# Patient Record
Sex: FEMALE | Race: WHITE | NOT HISPANIC OR LATINO | Employment: OTHER | URBAN - NONMETROPOLITAN AREA
[De-identification: names, ages, dates, MRNs, and addresses within clinical notes are randomized per-mention and may not be internally consistent; named-entity substitution may affect disease eponyms.]

---

## 2017-01-16 ENCOUNTER — OFFICE VISIT (OUTPATIENT)
Dept: FAMILY MEDICINE CLINIC | Facility: CLINIC | Age: 67
End: 2017-01-16

## 2017-01-16 VITALS
TEMPERATURE: 98.1 F | DIASTOLIC BLOOD PRESSURE: 86 MMHG | WEIGHT: 172 LBS | BODY MASS INDEX: 27 KG/M2 | RESPIRATION RATE: 16 BRPM | SYSTOLIC BLOOD PRESSURE: 148 MMHG | HEART RATE: 66 BPM | HEIGHT: 67 IN | OXYGEN SATURATION: 99 %

## 2017-01-16 DIAGNOSIS — I10 ESSENTIAL HYPERTENSION: Primary | ICD-10-CM

## 2017-01-16 PROCEDURE — 99213 OFFICE O/P EST LOW 20 MIN: CPT | Performed by: FAMILY MEDICINE

## 2017-01-16 RX ORDER — DILTIAZEM HYDROCHLORIDE 180 MG/1
180 CAPSULE, EXTENDED RELEASE ORAL DAILY
Qty: 90 CAPSULE | Refills: 2 | Status: CANCELLED | OUTPATIENT
Start: 2017-01-16

## 2017-01-16 RX ORDER — DILTIAZEM HYDROCHLORIDE 240 MG/1
240 CAPSULE, COATED, EXTENDED RELEASE ORAL DAILY
Qty: 30 CAPSULE | Refills: 6 | Status: SHIPPED | OUTPATIENT
Start: 2017-01-16 | End: 2017-04-19 | Stop reason: SDUPTHER

## 2017-01-16 NOTE — MR AVS SNAPSHOT
Kristal Lynn   1/16/2017 1:45 PM   Office Visit    Dept Phone:  [phone]   Encounter #:  [id number]    Provider:  Venkatesh Montoya MD   Department:  [facility]                Your Full Care Plan              Today's Medication Changes          These changes are accurate as of: 1/16/17  2:07 PM.  If you have any questions, ask your nurse or doctor.               Medication(s)that have changed:     diltiaZEM  MG 24 hr capsule   Commonly known as:  CARTIA XT   Take 1 capsule by mouth Daily.   What changed:    - medication strength  - how much to take   Changed by:  Venkatesh Montoya MD            Where to Get Your Medications      These medications were sent to [facility] - [place], [place] - [address] [place] [address] - [phone]  - [phone] [address] [address]     Phone:  [phone]     diltiaZEM  MG 24 hr capsule                  Your Updated Medication List          This list is accurate as of: 1/16/17  2:07 PM.  Always use your most recent med list.                calcium citrate-vitamin d 200-250 MG-UNIT tablet tablet   Commonly known as:  CITRACAL       cetirizine 10 MG tablet   Commonly known as:  zyrTEC       diltiaZEM  MG 24 hr capsule   Commonly known as:  CARTIA XT   Take 1 capsule by mouth Daily.       EPIPEN 2-GLORIA 0.3 MG/0.3ML solution auto-injector injection   Generic drug:  EPINEPHrine   USE AS DIRECTED       hydrochlorothiazide 12.5 MG capsule   Commonly known as:  MICROZIDE   Take 1 capsule by mouth Daily.               You Were Diagnosed With        Codes Comments    Essential hypertension    -  Primary ICD-10-CM: I10  ICD-9-CM: 401.9       Instructions     None    Patient Instructions History      Upcoming Appointments     Visit Type Date Time Department    OFFICE VISIT 1/16/2017  1:45 PM [facility]    OFFICE VISIT 4/18/2017  9:45 AM [facility]    "  MyChart Signup     [facility] allows you to send messages to your doctor, view your test results, renew your prescriptions, schedule appointments, and more. To sign up, go to [employer] and click on the Sign Up Now link in the New User? box. Enter your [employer] Activation Code exactly as it appears below along with the last four digits of your Social Security Number and your Date of Birth () to complete the sign-up process. If you do not sign up before the expiration date, you must request a new code.    [employer] Activation Code: [email or web address]  Expires: 2017  2:05 PM    If you have questions, you can email [email or web address] or call [phone] to talk to our [employer] staff. Remember, [employer] is NOT to be used for urgent needs. For medical emergencies, dial 911.               Other Info from Your Visit           Your Appointments     2017  9:45 AM CDT   Office Visit with Venkatesh Montoya MD   [facility] (--)    [address] [place] [id number] [phone]           Arrive 15 minutes prior to appointment.              Allergies     Aspirin      Diovan [Valsartan]      Latex      Metoprolol      Nsaids      Neomycin-bacitracin Zn-polymyx  Itching, Rash      Reason for Visit     Hypertension 4 week f/u       Vital Signs     Blood Pressure Pulse Temperature Respirations Height Weight    148/86 66 98.1 °F (36.7 °C) (Tympanic) 16 67\" (170.2 cm) 172 lb (78 kg)    Oxygen Saturation Breastfeeding? Body Mass Index Smoking Status          99% No 26.94 kg/m2 Never Smoker        Problems and Diagnoses Noted     High blood pressure        "

## 2017-01-16 NOTE — PROGRESS NOTES
Subjective   Kristal Lynn is a 66 y.o. female.   Chief Complaint   Patient presents with   • Hypertension     4 week f/u        History of Present Illness   Patient with history of hypertension is in today for reevaluation.  Blood pressure still been fluctuating but has been very stressed with the recent death of her mother.  Recent lab work was reviewed and normal.  Echo has not been completed.  Patient has complained of some headache and fatigue and feeling a little anxious  The following portions of the patient's history were reviewed and updated as appropriate: allergies, current medications, past family history, past medical history, past social history, past surgical history and problem list.    Review of Systems   Constitutional: Positive for fatigue.   Eyes: Negative.    Respiratory: Negative.    Cardiovascular: Negative.    Gastrointestinal: Negative.    Endocrine: Negative.    Genitourinary: Negative.    Musculoskeletal: Negative.    Skin: Negative.    Allergic/Immunologic: Negative.    Neurological: Positive for headaches.   Hematological: Negative.    Psychiatric/Behavioral: The patient is nervous/anxious.    All other systems reviewed and are negative.      Objective   Physical Exam   Constitutional: She is oriented to person, place, and time. She appears well-developed and well-nourished.   HENT:   Head: Normocephalic and atraumatic.   Right Ear: External ear normal.   Left Ear: External ear normal.   Nose: Nose normal.   Mouth/Throat: Oropharynx is clear and moist.   Eyes: Conjunctivae and EOM are normal. Pupils are equal, round, and reactive to light.   Neck: Normal range of motion. Neck supple.   Cardiovascular: Normal rate, regular rhythm, normal heart sounds and intact distal pulses.  Exam reveals no gallop and no friction rub.    No murmur heard.  Pulmonary/Chest: Effort normal and breath sounds normal. She has no wheezes. She has no rales.   Abdominal: Soft. Bowel sounds are normal. She  exhibits no mass. There is no tenderness. There is no rebound and no guarding.   Musculoskeletal: Normal range of motion.   Neurological: She is alert and oriented to person, place, and time. She has normal reflexes. No cranial nerve deficit. She exhibits normal muscle tone.   Skin: Skin is warm and dry. No rash noted.   Psychiatric: She has a normal mood and affect. Her behavior is normal. Judgment and thought content normal.   Nursing note and vitals reviewed.      Assessment/Plan   Kristal was seen today for hypertension.    Diagnoses and all orders for this visit:    Essential hypertension    Other orders  -     Cancel: CARTIA  MG 24 hr capsule; Take 1 capsule by mouth Daily.  -     diltiaZEM CD (CARTIA XT) 240 MG 24 hr capsule; Take 1 capsule by mouth Daily.      Echo was rescheduled

## 2017-04-19 ENCOUNTER — OFFICE VISIT (OUTPATIENT)
Dept: FAMILY MEDICINE CLINIC | Facility: CLINIC | Age: 67
End: 2017-04-19

## 2017-04-19 VITALS
TEMPERATURE: 97.3 F | SYSTOLIC BLOOD PRESSURE: 142 MMHG | HEART RATE: 54 BPM | DIASTOLIC BLOOD PRESSURE: 78 MMHG | BODY MASS INDEX: 26.68 KG/M2 | RESPIRATION RATE: 16 BRPM | WEIGHT: 170 LBS | HEIGHT: 67 IN | OXYGEN SATURATION: 96 %

## 2017-04-19 DIAGNOSIS — E78.5 HYPERLIPIDEMIA, UNSPECIFIED HYPERLIPIDEMIA TYPE: ICD-10-CM

## 2017-04-19 DIAGNOSIS — I10 ESSENTIAL HYPERTENSION: Primary | ICD-10-CM

## 2017-04-19 DIAGNOSIS — R73.9 HYPERGLYCEMIA: Primary | ICD-10-CM

## 2017-04-19 DIAGNOSIS — I10 ESSENTIAL HYPERTENSION: ICD-10-CM

## 2017-04-19 DIAGNOSIS — R53.83 FATIGUE, UNSPECIFIED TYPE: ICD-10-CM

## 2017-04-19 PROCEDURE — 99212 OFFICE O/P EST SF 10 MIN: CPT | Performed by: FAMILY MEDICINE

## 2017-04-19 RX ORDER — CLOBETASOL PROPIONATE 0.5 MG/G
OINTMENT TOPICAL 2 TIMES DAILY
COMMUNITY

## 2017-04-19 RX ORDER — HYDROCHLOROTHIAZIDE 12.5 MG/1
12.5 CAPSULE, GELATIN COATED ORAL DAILY
Qty: 90 CAPSULE | Refills: 2 | Status: SHIPPED | OUTPATIENT
Start: 2017-04-19 | End: 2017-12-13 | Stop reason: SDUPTHER

## 2017-04-19 RX ORDER — DILTIAZEM HYDROCHLORIDE 240 MG/1
240 CAPSULE, COATED, EXTENDED RELEASE ORAL DAILY
Qty: 90 CAPSULE | Refills: 2 | Status: SHIPPED | OUTPATIENT
Start: 2017-04-19 | End: 2017-06-13 | Stop reason: DRUGHIGH

## 2017-04-19 RX ORDER — MELATONIN
1000 DAILY
COMMUNITY

## 2017-04-19 NOTE — PROGRESS NOTES
Subjective   Kristal Lynn is a 66 y.o. female.   Chief Complaint   Patient presents with   • Hypertension     3 month f/u        Hypertension   This is a chronic problem. The current episode started more than 1 year ago. The problem has been gradually improving since onset. The problem is controlled. Pertinent negatives include no anxiety, blurred vision, chest pain, headaches, neck pain, orthopnea, palpitations, peripheral edema, PND, shortness of breath or sweats. Agents associated with hypertension include NSAIDs. Past treatments include calcium channel blockers. The current treatment provides significant improvement.        The following portions of the patient's history were reviewed and updated as appropriate: allergies, current medications, past family history, past medical history, past social history, past surgical history and problem list.    Review of Systems   Constitutional: Negative.    HENT: Negative.    Eyes: Negative.  Negative for blurred vision.   Respiratory: Negative.  Negative for shortness of breath.    Cardiovascular: Negative.  Negative for chest pain, palpitations, orthopnea and PND.   Gastrointestinal: Negative.    Endocrine: Negative.    Genitourinary: Negative.    Musculoskeletal: Negative.  Negative for neck pain.   Skin: Negative.    Allergic/Immunologic: Negative.    Neurological: Negative.  Negative for headaches.   Hematological: Negative.    Psychiatric/Behavioral: Negative.    All other systems reviewed and are negative.      Objective   Physical Exam   Constitutional: She is oriented to person, place, and time. She appears well-developed and well-nourished.   HENT:   Head: Normocephalic and atraumatic.   Right Ear: External ear normal.   Left Ear: External ear normal.   Nose: Nose normal.   Mouth/Throat: Oropharynx is clear and moist.   Eyes: Conjunctivae and EOM are normal. Pupils are equal, round, and reactive to light.   Neck: Normal range of motion. Neck supple.    Cardiovascular: Normal rate, regular rhythm, normal heart sounds and intact distal pulses.  Exam reveals no gallop and no friction rub.    No murmur heard.  Pulmonary/Chest: Effort normal and breath sounds normal. She has no wheezes. She has no rales.   Abdominal: Soft. Bowel sounds are normal. She exhibits no mass. There is no tenderness. There is no rebound and no guarding.   Musculoskeletal: Normal range of motion.   Neurological: She is alert and oriented to person, place, and time. She has normal reflexes. No cranial nerve deficit. She exhibits normal muscle tone.   Skin: Skin is warm and dry. No rash noted.   Psychiatric: She has a normal mood and affect. Her behavior is normal. Judgment and thought content normal.   Nursing note and vitals reviewed.      Assessment/Plan   Kristal was seen today for hypertension.    Diagnoses and all orders for this visit:    Essential hypertension      Continue current therapies

## 2017-06-05 LAB
ALBUMIN SERPL-MCNC: 4.4 G/DL (ref 3.2–5.5)
ALBUMIN/GLOB SERPL: 1.6 G/DL (ref 1–3)
ALP SERPL-CCNC: 72 U/L (ref 15–121)
ALT SERPL W P-5'-P-CCNC: 61 U/L (ref 10–60)
ANION GAP SERPL CALCULATED.3IONS-SCNC: 11 MMOL/L (ref 5–15)
AST SERPL-CCNC: 53 U/L (ref 10–60)
BASOPHILS # BLD AUTO: 0.02 10*3/MM3 (ref 0–0.2)
BASOPHILS NFR BLD AUTO: 0.5 % (ref 0–2)
BILIRUB SERPL-MCNC: 0.8 MG/DL (ref 0.2–1)
BUN BLD-MCNC: 10 MG/DL (ref 8–25)
BUN/CREAT SERPL: 11.1 (ref 7–25)
CALCIUM SPEC-SCNC: 9.6 MG/DL (ref 8.4–10.8)
CHLORIDE SERPL-SCNC: 104 MMOL/L (ref 100–112)
CHOLEST SERPL-MCNC: 225 MG/DL (ref 150–200)
CO2 SERPL-SCNC: 28 MMOL/L (ref 20–32)
CREAT BLD-MCNC: 0.9 MG/DL (ref 0.4–1.3)
DEPRECATED RDW RBC AUTO: 42.5 FL (ref 36.4–46.3)
EOSINOPHIL # BLD AUTO: 0.21 10*3/MM3 (ref 0–0.7)
EOSINOPHIL NFR BLD AUTO: 5.3 % (ref 0–7)
ERYTHROCYTE [DISTWIDTH] IN BLOOD BY AUTOMATED COUNT: 13.5 % (ref 11.5–14.5)
GFR SERPL CREATININE-BSD FRML MDRD: 62 ML/MIN/1.73 (ref 45–104)
GLOBULIN UR ELPH-MCNC: 2.7 GM/DL (ref 2.5–4.6)
GLUCOSE BLD-MCNC: 110 MG/DL (ref 70–100)
HCT VFR BLD AUTO: 41.4 % (ref 35–45)
HDLC SERPL-MCNC: 42 MG/DL (ref 35–100)
HGB BLD-MCNC: 14.3 G/DL (ref 12–15.5)
LDLC SERPL CALC-MCNC: 131 MG/DL
LDLC/HDLC SERPL: 3.12 {RATIO}
LYMPHOCYTES # BLD AUTO: 1.51 10*3/MM3 (ref 0.6–4.2)
LYMPHOCYTES NFR BLD AUTO: 38.4 % (ref 10–50)
MCH RBC QN AUTO: 30.6 PG (ref 26.5–34)
MCHC RBC AUTO-ENTMCNC: 34.5 G/DL (ref 31.4–36)
MCV RBC AUTO: 88.5 FL (ref 80–98)
MONOCYTES # BLD AUTO: 0.31 10*3/MM3 (ref 0–0.9)
MONOCYTES NFR BLD AUTO: 7.9 % (ref 0–12)
NEUTROPHILS # BLD AUTO: 1.88 10*3/MM3 (ref 2–8.6)
NEUTROPHILS NFR BLD AUTO: 47.9 % (ref 37–80)
PLATELET # BLD AUTO: 211 10*3/MM3 (ref 150–450)
PMV BLD AUTO: 9.8 FL (ref 8–12)
POTASSIUM BLD-SCNC: 4.1 MMOL/L (ref 3.4–5.4)
PROT SERPL-MCNC: 7.1 G/DL (ref 6.7–8.2)
RBC # BLD AUTO: 4.68 10*6/MM3 (ref 3.77–5.16)
SODIUM BLD-SCNC: 143 MMOL/L (ref 134–146)
TRIGL SERPL-MCNC: 259 MG/DL (ref 35–160)
VLDLC SERPL-MCNC: 51.8 MG/DL
WBC NRBC COR # BLD: 3.93 10*3/MM3 (ref 3.2–9.8)

## 2017-06-05 PROCEDURE — 80061 LIPID PANEL: CPT | Performed by: FAMILY MEDICINE

## 2017-06-05 PROCEDURE — 82607 VITAMIN B-12: CPT | Performed by: FAMILY MEDICINE

## 2017-06-05 PROCEDURE — 83036 HEMOGLOBIN GLYCOSYLATED A1C: CPT | Performed by: FAMILY MEDICINE

## 2017-06-05 PROCEDURE — 80053 COMPREHEN METABOLIC PANEL: CPT | Performed by: FAMILY MEDICINE

## 2017-06-05 PROCEDURE — 85025 COMPLETE CBC W/AUTO DIFF WBC: CPT | Performed by: FAMILY MEDICINE

## 2017-06-05 PROCEDURE — 36415 COLL VENOUS BLD VENIPUNCTURE: CPT | Performed by: FAMILY MEDICINE

## 2017-06-05 PROCEDURE — 84443 ASSAY THYROID STIM HORMONE: CPT | Performed by: FAMILY MEDICINE

## 2017-06-06 LAB
HBA1C MFR BLD: 5.74 % (ref 4–5.6)
TSH SERPL DL<=0.05 MIU/L-ACNC: 3.91 MIU/ML (ref 0.46–4.68)
VIT B12 BLD-MCNC: 764 PG/ML (ref 239–931)

## 2017-06-13 ENCOUNTER — OFFICE VISIT (OUTPATIENT)
Dept: FAMILY MEDICINE CLINIC | Facility: CLINIC | Age: 67
End: 2017-06-13

## 2017-06-13 VITALS
HEART RATE: 63 BPM | DIASTOLIC BLOOD PRESSURE: 76 MMHG | SYSTOLIC BLOOD PRESSURE: 132 MMHG | OXYGEN SATURATION: 97 % | TEMPERATURE: 97.1 F | HEIGHT: 67 IN | BODY MASS INDEX: 27 KG/M2 | WEIGHT: 172 LBS

## 2017-06-13 DIAGNOSIS — E78.01 FAMILIAL HYPERCHOLESTEROLEMIA: ICD-10-CM

## 2017-06-13 DIAGNOSIS — R73.9 HYPERGLYCEMIA: ICD-10-CM

## 2017-06-13 DIAGNOSIS — I10 ESSENTIAL HYPERTENSION: Primary | ICD-10-CM

## 2017-06-13 PROCEDURE — 99214 OFFICE O/P EST MOD 30 MIN: CPT | Performed by: FAMILY MEDICINE

## 2017-06-13 RX ORDER — DILTIAZEM HYDROCHLORIDE 180 MG/1
180 CAPSULE, COATED, EXTENDED RELEASE ORAL DAILY
Qty: 30 CAPSULE | Refills: 6 | Status: SHIPPED | OUTPATIENT
Start: 2017-06-13 | End: 2017-12-13 | Stop reason: SDUPTHER

## 2017-06-13 NOTE — PATIENT INSTRUCTIONS

## 2017-06-13 NOTE — PROGRESS NOTES
Subjective   Kristal Lynn is a 66 y.o. female who presents to the office for follow-up and review of labs.     History of Present Illness   Patient with history of hypertension, hyperlipidemia, and hyperglycemia is in today for reevaluation and to review labs.  It has been complaining of fatigue and low heart rate.  Also had a few episodes of low blood pressure.  Denies chest pain PND orthopnea.  No neurological symptoms.    The following portions of the patient's history were reviewed and updated as appropriate: allergies, current medications, past family history, past medical history, past social history, past surgical history and problem list.    Review of Systems   Constitutional: Positive for fatigue.   HENT: Negative.    Eyes: Negative.    Respiratory: Negative.    Cardiovascular: Negative.    Gastrointestinal: Negative.    Endocrine: Negative.    Genitourinary: Negative.    Musculoskeletal: Negative.    Skin: Negative.    Allergic/Immunologic: Negative.    Neurological: Positive for weakness.   Hematological: Negative.    Psychiatric/Behavioral: Negative.    All other systems reviewed and are negative.      Objective   Physical Exam   Constitutional: She is oriented to person, place, and time. She appears well-developed and well-nourished.   HENT:   Head: Normocephalic and atraumatic.   Right Ear: External ear normal.   Left Ear: External ear normal.   Nose: Nose normal.   Mouth/Throat: Oropharynx is clear and moist.   Eyes: Conjunctivae and EOM are normal. Pupils are equal, round, and reactive to light.   Neck: Normal range of motion. Neck supple.   Cardiovascular: Normal rate, regular rhythm, normal heart sounds and intact distal pulses.  Exam reveals no gallop and no friction rub.    No murmur heard.  Pulmonary/Chest: Effort normal and breath sounds normal. She has no wheezes. She has no rales.   Abdominal: Soft. Bowel sounds are normal. She exhibits no mass. There is no tenderness. There is no rebound  and no guarding.   Musculoskeletal: Normal range of motion.   Neurological: She is alert and oriented to person, place, and time. She has normal reflexes. No cranial nerve deficit. She exhibits normal muscle tone.   Skin: Skin is warm and dry. No rash noted.   Psychiatric: She has a normal mood and affect. Her behavior is normal. Judgment and thought content normal.   Nursing note and vitals reviewed.      Assessment/Plan   Kristal was seen today for hypertension, hyperlipidemia and results.    Diagnoses and all orders for this visit:    Essential hypertension    Familial hypercholesterolemia  -     LDL Cholesterol, Direct; Future    Hyperglycemia  -     Comprehensive Metabolic Panel; Future  -     Hemoglobin A1c; Future  -     LDL Cholesterol, Direct; Future    Other orders  -     diltiaZEM CD (CARDIZEM CD) 180 MG 24 hr capsule; Take 1 capsule by mouth Daily.         Labs are reviewed with patient.    PHQ-9 Depression Screening 6/13/2017   Little interest or pleasure in doing things 0   Feeling down, depressed, or hopeless 0   Trouble falling or staying asleep, or sleeping too much 0   Feeling tired or having little energy 1   Poor appetite or overeating 0   Feeling bad about yourself - or that you are a failure or have let yourself or your family down 0   Trouble concentrating on things, such as reading the newspaper or watching television 0   Moving or speaking so slowly that other people could have noticed. Or the opposite - being so fidgety or restless that you have been moving around a lot more than usual 0   Thoughts that you would be better off dead, or of hurting yourself in some way 0   PHQ-9 Total Score 1   If you checked off any problems, how difficult have these problems made it for you to do your work, take care of things at home, or get along with other people? Somewhat difficult         Orders Only on 04/19/2017   Component Date Value Ref Range Status   • Glucose 06/05/2017 110* 70 - 100 mg/dL Final    • BUN 06/05/2017 10  8 - 25 mg/dL Final   • Creatinine 06/05/2017 0.90  0.40 - 1.30 mg/dL Final   • Sodium 06/05/2017 143  134 - 146 mmol/L Final   • Potassium 06/05/2017 4.1  3.4 - 5.4 mmol/L Final   • Chloride 06/05/2017 104  100 - 112 mmol/L Final   • CO2 06/05/2017 28.0  20.0 - 32.0 mmol/L Final   • Calcium 06/05/2017 9.6  8.4 - 10.8 mg/dL Final   • Total Protein 06/05/2017 7.1  6.7 - 8.2 g/dL Final   • Albumin 06/05/2017 4.40  3.20 - 5.50 g/dL Final   • ALT (SGPT) 06/05/2017 61* 10 - 60 U/L Final   • AST (SGOT) 06/05/2017 53  10 - 60 U/L Final   • Alkaline Phosphatase 06/05/2017 72  15 - 121 U/L Final   • Total Bilirubin 06/05/2017 0.8  0.2 - 1.0 mg/dL Final   • eGFR Non African Amer 06/05/2017 62  45 - 104 mL/min/1.73 Final   • Globulin 06/05/2017 2.7  2.5 - 4.6 gm/dL Final   • A/G Ratio 06/05/2017 1.6  1.0 - 3.0 g/dL Final   • BUN/Creatinine Ratio 06/05/2017 11.1  7.0 - 25.0 Final   • Anion Gap 06/05/2017 11.0  5.0 - 15.0 mmol/L Final   • Total Cholesterol 06/05/2017 225* 150 - 200 mg/dL Final   • Triglycerides 06/05/2017 259* 35 - 160 mg/dL Final   • HDL Cholesterol 06/05/2017 42  35 - 100 mg/dL Final   • LDL Cholesterol  06/05/2017 131  mg/dL Final   • VLDL Cholesterol 06/05/2017 51.8  mg/dL Final   • LDL/HDL Ratio 06/05/2017 3.12   Final   • TSH 06/05/2017 3.910  0.460 - 4.680 mIU/mL Final   • Hemoglobin A1C 06/05/2017 5.74* 4 - 5.6 % Final   • Vitamin B-12 06/05/2017 764  239 - 931 pg/mL Final   • WBC 06/05/2017 3.93  3.20 - 9.80 10*3/mm3 Final   • RBC 06/05/2017 4.68  3.77 - 5.16 10*6/mm3 Final   • Hemoglobin 06/05/2017 14.3  12.0 - 15.5 g/dL Final   • Hematocrit 06/05/2017 41.4  35.0 - 45.0 % Final   • MCV 06/05/2017 88.5  80.0 - 98.0 fL Final   • MCH 06/05/2017 30.6  26.5 - 34.0 pg Final   • MCHC 06/05/2017 34.5  31.4 - 36.0 g/dL Final   • RDW 06/05/2017 13.5  11.5 - 14.5 % Final   • RDW-SD 06/05/2017 42.5  36.4 - 46.3 fl Final   • MPV 06/05/2017 9.8  8.0 - 12.0 fL Final   • Platelets 06/05/2017 211   150 - 450 10*3/mm3 Final   • Neutrophil % 06/05/2017 47.9  37.0 - 80.0 % Final   • Lymphocyte % 06/05/2017 38.4  10.0 - 50.0 % Final   • Monocyte % 06/05/2017 7.9  0.0 - 12.0 % Final   • Eosinophil % 06/05/2017 5.3  0.0 - 7.0 % Final   • Basophil % 06/05/2017 0.5  0.0 - 2.0 % Final   • Neutrophils, Absolute 06/05/2017 1.88* 2.00 - 8.60 10*3/mm3 Final   • Lymphocytes, Absolute 06/05/2017 1.51  0.60 - 4.20 10*3/mm3 Final   • Monocytes, Absolute 06/05/2017 0.31  0.00 - 0.90 10*3/mm3 Final   • Eosinophils, Absolute 06/05/2017 0.21  0.00 - 0.70 10*3/mm3 Final   • Basophils, Absolute 06/05/2017 0.02  0.00 - 0.20 10*3/mm3 Final   Hospital Outpatient Visit on 12/19/2016   Component Date Value Ref Range Status   • TSH 12/19/2016 2.68  0.46 - 4.68 uIU/ml Final   • Vitamin B-12 12/19/2016 383  239 - 931 pg/ml Final   ]

## 2017-12-06 ENCOUNTER — LAB (OUTPATIENT)
Dept: LAB | Facility: OTHER | Age: 67
End: 2017-12-06

## 2017-12-06 DIAGNOSIS — R73.9 HYPERGLYCEMIA: ICD-10-CM

## 2017-12-06 DIAGNOSIS — E78.01 FAMILIAL HYPERCHOLESTEROLEMIA: ICD-10-CM

## 2017-12-06 LAB
ALBUMIN SERPL-MCNC: 4.2 G/DL (ref 3.2–5.5)
ALBUMIN/GLOB SERPL: 1.6 G/DL (ref 1–3)
ALP SERPL-CCNC: 81 U/L (ref 15–121)
ALT SERPL W P-5'-P-CCNC: 42 U/L (ref 10–60)
ANION GAP SERPL CALCULATED.3IONS-SCNC: 10 MMOL/L (ref 5–15)
ARTICHOKE IGE QN: 130 MG/DL (ref 0–129)
AST SERPL-CCNC: 35 U/L (ref 10–60)
BILIRUB SERPL-MCNC: 0.7 MG/DL (ref 0.2–1)
BUN BLD-MCNC: 15 MG/DL (ref 8–25)
BUN/CREAT SERPL: 18.8 (ref 7–25)
CALCIUM SPEC-SCNC: 9.3 MG/DL (ref 8.4–10.8)
CHLORIDE SERPL-SCNC: 105 MMOL/L (ref 100–112)
CO2 SERPL-SCNC: 29 MMOL/L (ref 20–32)
CREAT BLD-MCNC: 0.8 MG/DL (ref 0.4–1.3)
GFR SERPL CREATININE-BSD FRML MDRD: 71 ML/MIN/1.73 (ref 45–104)
GLOBULIN UR ELPH-MCNC: 2.6 GM/DL (ref 2.5–4.6)
GLUCOSE BLD-MCNC: 113 MG/DL (ref 70–100)
HBA1C MFR BLD: 5.6 % (ref 4–5.6)
POTASSIUM BLD-SCNC: 3.3 MMOL/L (ref 3.4–5.4)
PROT SERPL-MCNC: 6.8 G/DL (ref 6.7–8.2)
SODIUM BLD-SCNC: 144 MMOL/L (ref 134–146)

## 2017-12-06 PROCEDURE — 83721 ASSAY OF BLOOD LIPOPROTEIN: CPT | Performed by: FAMILY MEDICINE

## 2017-12-06 PROCEDURE — 80053 COMPREHEN METABOLIC PANEL: CPT | Performed by: FAMILY MEDICINE

## 2017-12-06 PROCEDURE — 36415 COLL VENOUS BLD VENIPUNCTURE: CPT | Performed by: FAMILY MEDICINE

## 2017-12-06 PROCEDURE — 83036 HEMOGLOBIN GLYCOSYLATED A1C: CPT | Performed by: FAMILY MEDICINE

## 2017-12-13 ENCOUNTER — OFFICE VISIT (OUTPATIENT)
Dept: FAMILY MEDICINE CLINIC | Facility: CLINIC | Age: 67
End: 2017-12-13

## 2017-12-13 VITALS
DIASTOLIC BLOOD PRESSURE: 80 MMHG | SYSTOLIC BLOOD PRESSURE: 118 MMHG | RESPIRATION RATE: 16 BRPM | TEMPERATURE: 97.1 F | HEART RATE: 61 BPM | HEIGHT: 67 IN | WEIGHT: 174.6 LBS | OXYGEN SATURATION: 97 % | BODY MASS INDEX: 27.4 KG/M2

## 2017-12-13 DIAGNOSIS — E78.01 FAMILIAL HYPERCHOLESTEROLEMIA: ICD-10-CM

## 2017-12-13 DIAGNOSIS — E87.6 HYPOKALEMIA: ICD-10-CM

## 2017-12-13 DIAGNOSIS — R73.9 HYPERGLYCEMIA: ICD-10-CM

## 2017-12-13 DIAGNOSIS — I10 ESSENTIAL HYPERTENSION: Primary | ICD-10-CM

## 2017-12-13 PROCEDURE — 99213 OFFICE O/P EST LOW 20 MIN: CPT | Performed by: FAMILY MEDICINE

## 2017-12-13 RX ORDER — HYDROCHLOROTHIAZIDE 12.5 MG/1
12.5 CAPSULE, GELATIN COATED ORAL DAILY
Qty: 90 CAPSULE | Refills: 2 | Status: SHIPPED | OUTPATIENT
Start: 2017-12-13 | End: 2018-06-13 | Stop reason: SDUPTHER

## 2017-12-13 RX ORDER — DILTIAZEM HYDROCHLORIDE 180 MG/1
180 CAPSULE, COATED, EXTENDED RELEASE ORAL DAILY
Qty: 90 CAPSULE | Refills: 3 | Status: SHIPPED | OUTPATIENT
Start: 2017-12-13 | End: 2018-06-13 | Stop reason: SDUPTHER

## 2017-12-13 NOTE — PROGRESS NOTES
Subjective   Kristal Lynn is a 67 y.o. female.   Chief Complaint   Patient presents with   • Hypertension   • Hyperlipidemia       Hypertension   This is a chronic problem. The current episode started more than 1 year ago. The problem is unchanged. The problem is controlled. Pertinent negatives include no blurred vision, chest pain, malaise/fatigue, palpitations, PND or shortness of breath. There are no associated agents to hypertension. Past treatments include calcium channel blockers and diuretics. The current treatment provides significant improvement. There are no compliance problems.    Hyperlipidemia   Pertinent negatives include no chest pain or shortness of breath.        The following portions of the patient's history were reviewed and updated as appropriate: allergies, current medications, past family history, past medical history, past social history, past surgical history and problem list.    Review of Systems   Constitutional: Positive for fatigue. Negative for malaise/fatigue.   HENT: Negative.    Eyes: Negative.  Negative for blurred vision.   Respiratory: Negative.  Negative for shortness of breath.    Cardiovascular: Negative.  Negative for chest pain, palpitations and PND.   Gastrointestinal: Negative.    Endocrine: Negative.    Genitourinary: Negative.    Musculoskeletal: Negative.    Skin: Negative.    Allergic/Immunologic: Negative.    Neurological: Positive for weakness.   Hematological: Negative.    Psychiatric/Behavioral: Negative.    All other systems reviewed and are negative.      Objective   Physical Exam   Constitutional: She is oriented to person, place, and time. She appears well-developed and well-nourished.   HENT:   Head: Normocephalic and atraumatic.   Right Ear: External ear normal.   Left Ear: External ear normal.   Nose: Nose normal.   Mouth/Throat: Oropharynx is clear and moist.   Eyes: Conjunctivae and EOM are normal. Pupils are equal, round, and reactive to light.   Neck:  Normal range of motion. Neck supple.   Cardiovascular: Normal rate, regular rhythm, normal heart sounds and intact distal pulses.  Exam reveals no gallop and no friction rub.    No murmur heard.  Pulmonary/Chest: Effort normal and breath sounds normal. She has no wheezes. She has no rales.   Abdominal: Soft. Bowel sounds are normal. She exhibits no mass. There is no tenderness. There is no rebound and no guarding.   Musculoskeletal: Normal range of motion.   Neurological: She is alert and oriented to person, place, and time. She has normal reflexes. No cranial nerve deficit. She exhibits normal muscle tone.   Skin: Skin is warm and dry. No rash noted.   Psychiatric: She has a normal mood and affect. Her behavior is normal. Judgment and thought content normal.   Nursing note and vitals reviewed.  Labs reviewed.    Assessment/Plan   Kristal was seen today for hypertension and hyperlipidemia.    Diagnoses and all orders for this visit:    Essential hypertension  -     CBC & Differential; Future  -     Comprehensive Metabolic Panel; Future  -     Lipid Panel; Future  -     TSH; Future    Familial hypercholesterolemia    Hyperglycemia  -     Hemoglobin A1c; Future    Hypokalemia    Other orders  -     hydrochlorothiazide (MICROZIDE) 12.5 MG capsule; Take 1 capsule by mouth Daily.        Recommended OTC potassium supplementation as well.

## 2017-12-20 ENCOUNTER — OFFICE VISIT (OUTPATIENT)
Dept: FAMILY MEDICINE CLINIC | Facility: CLINIC | Age: 67
End: 2017-12-20

## 2017-12-20 VITALS
HEART RATE: 77 BPM | BODY MASS INDEX: 26.84 KG/M2 | TEMPERATURE: 101.1 F | OXYGEN SATURATION: 93 % | DIASTOLIC BLOOD PRESSURE: 86 MMHG | HEIGHT: 67 IN | SYSTOLIC BLOOD PRESSURE: 142 MMHG | WEIGHT: 171 LBS

## 2017-12-20 DIAGNOSIS — R68.89 FLU-LIKE SYMPTOMS: ICD-10-CM

## 2017-12-20 DIAGNOSIS — I10 ESSENTIAL HYPERTENSION: ICD-10-CM

## 2017-12-20 DIAGNOSIS — R50.9 FEVER, UNSPECIFIED FEVER CAUSE: ICD-10-CM

## 2017-12-20 DIAGNOSIS — J06.9 VIRAL UPPER RESPIRATORY TRACT INFECTION: Primary | ICD-10-CM

## 2017-12-20 LAB
EXPIRATION DATE: NORMAL
FLUAV AG NPH QL: NEGATIVE
FLUBV AG NPH QL: NEGATIVE
INTERNAL CONTROL: NORMAL
Lab: NORMAL

## 2017-12-20 PROCEDURE — 99213 OFFICE O/P EST LOW 20 MIN: CPT | Performed by: NURSE PRACTITIONER

## 2017-12-20 PROCEDURE — 87804 INFLUENZA ASSAY W/OPTIC: CPT | Performed by: NURSE PRACTITIONER

## 2017-12-20 RX ORDER — ESTRADIOL 0.1 MG/G
1 CREAM VAGINAL DAILY
COMMUNITY
Start: 2017-12-14 | End: 2018-03-20

## 2017-12-20 NOTE — PROGRESS NOTES
Chief Complaint   Patient presents with   • Generalized Body Aches     x 2 days    • Nasal Congestion       Subjective   Kristal Lynn is a 67 y.o. female presents to the office for evaluation of flu-like symptoms x 2 days.    PMH  HTN- well controlled with HCTz and dilt    HPI   Patient states she became ill yesterday with nasal congestion. Overnight, she developed a cough, chills, fever and body aches. She has taken tylenol X 1 with moderate relief of symptoms. She denies known contact with flu.       Fever    This is a new problem. The current episode started yesterday. The problem occurs intermittently. The problem has been unchanged. The maximum temperature noted was 101 to 101.9 F. Associated symptoms include congestion, coughing, headaches and muscle aches. Pertinent negatives include no abdominal pain, chest pain, diarrhea, ear pain, nausea, rash, sore throat, urinary pain or wheezing. She has tried acetaminophen for the symptoms. The treatment provided moderate relief.   Risk factors: no recent sickness      Family History   Problem Relation Age of Onset   • Cancer Other    • Heart disease Other      Social History     Social History   • Marital status:      Spouse name: Venkatesh LAZAR   • Number of children: 2   • Years of education: N/A     Occupational History   • Not on file.     Social History Main Topics   • Smoking status: Never Smoker   • Smokeless tobacco: Never Used   • Alcohol use No   • Drug use: No   • Sexual activity: Yes     Partners: Male     Other Topics Concern   • Not on file     Social History Narrative       The following portions of the patient's history were reviewed and updated as appropriate: allergies, current medications, past family history, past medical history, past social history, past surgical history and problem list.    Review of Systems   Constitutional: Positive for chills, fatigue and fever. Negative for activity change and unexpected weight change.   HENT: Positive  "for congestion, postnasal drip and rhinorrhea. Negative for ear pain, sinus pressure and sore throat.    Eyes: Negative.  Negative for pain and redness.   Respiratory: Positive for cough. Negative for choking, chest tightness, shortness of breath and wheezing.    Cardiovascular: Negative.  Negative for chest pain, palpitations and leg swelling.   Gastrointestinal: Negative.  Negative for abdominal distention, abdominal pain, constipation, diarrhea, nausea and rectal pain.   Endocrine: Negative.    Genitourinary: Negative.  Negative for decreased urine volume, difficulty urinating, dysuria, flank pain, hematuria and urgency.   Musculoskeletal: Negative.  Negative for gait problem and neck pain.   Skin: Negative.  Negative for rash and wound.   Allergic/Immunologic: Negative.    Neurological: Positive for headaches. Negative for dizziness, syncope, weakness, light-headedness and numbness.   Hematological: Negative.    Psychiatric/Behavioral: Negative.  Negative for agitation, behavioral problems, confusion, self-injury, sleep disturbance and suicidal ideas. The patient is not nervous/anxious.      14 Point ROS completed with pertinent positives discussed. All other systems reviewed and are negative.       Objective   Encounter Vitals  /86  Pulse 77  Temp (!) 101.1 °F (38.4 °C) (Tympanic)   Ht 170.2 cm (67\")  Wt 77.6 kg (171 lb)  SpO2 93%  Breastfeeding? No  BMI 26.78 kg/m2    Physical Exam   Constitutional: She is oriented to person, place, and time. Vital signs are normal. She appears well-developed and well-nourished.   HENT:   Head: Normocephalic and atraumatic.   Right Ear: Tympanic membrane, external ear and ear canal normal.   Left Ear: Tympanic membrane, external ear and ear canal normal.   Nose: Nose normal. Right sinus exhibits no maxillary sinus tenderness and no frontal sinus tenderness. Left sinus exhibits no maxillary sinus tenderness and no frontal sinus tenderness.   Mouth/Throat: Uvula is " midline, oropharynx is clear and moist and mucous membranes are normal. No posterior oropharyngeal edema or posterior oropharyngeal erythema. No tonsillar exudate.   Copious clear post nasal drip   Eyes: Lids are normal. Pupils are equal, round, and reactive to light.   Neck: Trachea normal and normal range of motion. Neck supple. No thyroid mass present.   Cardiovascular: Normal rate, regular rhythm, S1 normal, S2 normal, normal heart sounds and intact distal pulses.  Exam reveals no gallop and no friction rub.    No murmur heard.  Pulmonary/Chest: Effort normal and breath sounds normal. No respiratory distress. She has no wheezes. She has no rales.   Abdominal: Soft. Normal appearance and bowel sounds are normal. She exhibits no mass. There is no rebound and no guarding.   Musculoskeletal: Normal range of motion.   Lymphadenopathy:     She has no cervical adenopathy.   Neurological: She is alert and oriented to person, place, and time. She has normal strength. No cranial nerve deficit or sensory deficit. Gait normal.   Skin: Skin is warm and dry. No rash noted.   Psychiatric: She has a normal mood and affect. Her speech is normal and behavior is normal. Judgment and thought content normal. Cognition and memory are normal.   Nursing note and vitals reviewed.      Pertinent Labs  Office Visit on 12/20/2017   Component Date Value Ref Range Status   • Rapid Influenza A Ag 12/20/2017 negative   Final   • Rapid Influenza B Ag 12/20/2017 negative   Final   • Internal Control 12/20/2017 Passed  Passed Final   • Lot Number 12/20/2017 [id number]   Final   • Expiration Date 12/20/2017 9/2019   Final   Lab on 12/06/2017   Component Date Value Ref Range Status   • Glucose 12/06/2017 113* 70 - 100 mg/dL Final   • BUN 12/06/2017 15  8 - 25 mg/dL Final   • Creatinine 12/06/2017 0.80  0.40 - 1.30 mg/dL Final   • Sodium 12/06/2017 144  134 - 146 mmol/L Final   • Potassium 12/06/2017 3.3* 3.4 - 5.4 mmol/L Final   • Chloride 12/06/2017  105  100 - 112 mmol/L Final   • CO2 12/06/2017 29.0  20.0 - 32.0 mmol/L Final   • Calcium 12/06/2017 9.3  8.4 - 10.8 mg/dL Final   • Total Protein 12/06/2017 6.8  6.7 - 8.2 g/dL Final   • Albumin 12/06/2017 4.20  3.20 - 5.50 g/dL Final   • ALT (SGPT) 12/06/2017 42  10 - 60 U/L Final   • AST (SGOT) 12/06/2017 35  10 - 60 U/L Final   • Alkaline Phosphatase 12/06/2017 81  15 - 121 U/L Final   • Total Bilirubin 12/06/2017 0.7  0.2 - 1.0 mg/dL Final   • eGFR Non African Amer 12/06/2017 71  45 - 104 mL/min/1.73 Final   • Globulin 12/06/2017 2.6  2.5 - 4.6 gm/dL Final   • A/G Ratio 12/06/2017 1.6  1.0 - 3.0 g/dL Final   • BUN/Creatinine Ratio 12/06/2017 18.8  7.0 - 25.0 Final   • Anion Gap 12/06/2017 10.0  5.0 - 15.0 mmol/L Final   • Hemoglobin A1C 12/06/2017 5.6  4 - 5.6 % Final   • LDL Cholesterol  12/06/2017 130* 0 - 129 mg/dL Final     Labs have been independently reviewed    Key Imaging/Tracings/POC Testing    Assessment and Medications  Problems Addressed this Visit        Cardiovascular and Mediastinum    Essential hypertension      Other Visit Diagnoses     Viral upper respiratory tract infection    -  Primary    Fever, unspecified fever cause        Flu-like symptoms        Relevant Orders    POCT Influenza A/B (Completed)        Side effects of ordered medications discussed with patient.     Plan/Additional Notes/Instructions  Plan   1. Viral URI  2. Treat symptoms with otc medication  3. If no better by Friday, call and report. Ok with ordering PO abx- zpack  4. RTC if symptoms worsen    Follow-Up  Return if symptoms worsen or fail to improve.    Patient/caregiver verbalizes understanding of all orders and instructions in this plan of care.       This document has been electronically signed by JERAD Abbott on December 20, 2017 3:13 PM

## 2018-01-04 ENCOUNTER — OFFICE VISIT (OUTPATIENT)
Dept: FAMILY MEDICINE CLINIC | Facility: CLINIC | Age: 68
End: 2018-01-04

## 2018-01-04 VITALS
OXYGEN SATURATION: 97 % | HEART RATE: 62 BPM | WEIGHT: 173.2 LBS | HEIGHT: 67 IN | DIASTOLIC BLOOD PRESSURE: 82 MMHG | BODY MASS INDEX: 27.18 KG/M2 | TEMPERATURE: 99.1 F | SYSTOLIC BLOOD PRESSURE: 110 MMHG

## 2018-01-04 DIAGNOSIS — R59.9 SWELLING OF LYMPH NODE: ICD-10-CM

## 2018-01-04 DIAGNOSIS — J02.9 ACUTE PHARYNGITIS, UNSPECIFIED ETIOLOGY: Primary | ICD-10-CM

## 2018-01-04 PROCEDURE — 99214 OFFICE O/P EST MOD 30 MIN: CPT | Performed by: NURSE PRACTITIONER

## 2018-01-04 RX ORDER — AMOXICILLIN AND CLAVULANATE POTASSIUM 875; 125 MG/1; MG/1
1 TABLET, FILM COATED ORAL 2 TIMES DAILY
Qty: 20 TABLET | Refills: 0 | Status: SHIPPED | OUTPATIENT
Start: 2018-01-04 | End: 2018-03-20

## 2018-01-04 RX ORDER — FLUCONAZOLE 150 MG/1
150 TABLET ORAL ONCE
Qty: 2 TABLET | Refills: 0 | Status: SHIPPED | OUTPATIENT
Start: 2018-01-04 | End: 2018-01-04

## 2018-01-04 RX ORDER — CEFTRIAXONE 1 G/1
1 INJECTION, POWDER, FOR SOLUTION INTRAMUSCULAR; INTRAVENOUS ONCE
Status: DISCONTINUED | OUTPATIENT
Start: 2018-01-04 | End: 2018-03-20

## 2018-01-04 NOTE — PROGRESS NOTES
Chief Complaint   Patient presents with   • Neck Pain     hurts when eating, moving, talking and at different times. one side feels diff than other but not sore throat       Subjective   Kristal Lynn is a 67 y.o. female presents to the office for evaluation of left throat pain X 3 days.    PMH  HTN- well controlled with HCTz and dilt    HPI   Patient presents with acute c/o left throat/gland pain that is exacerbated by eating, moving mouth, and turning her head to the left. Pain began on Tues. She took a tylenol last night with mild relief of pain. Associated symptoms include nasal congestion and low grade fever. No tooth pain, throat pain, muscle aches, or colored drainage.     Last TSH 3.910    HPI  Family History   Problem Relation Age of Onset   • Cancer Other    • Heart disease Other      Social History     Social History   • Marital status:      Spouse name: Venkatesh LAZAR   • Number of children: 2   • Years of education: N/A     Occupational History   • Not on file.     Social History Main Topics   • Smoking status: Never Smoker   • Smokeless tobacco: Never Used   • Alcohol use No   • Drug use: No   • Sexual activity: Yes     Partners: Male     Other Topics Concern   • Not on file     Social History Narrative       The following portions of the patient's history were reviewed and updated as appropriate: allergies, current medications, past family history, past medical history, past social history, past surgical history and problem list.    Review of Systems   Constitutional: Negative.  Negative for activity change, chills, fatigue, fever and unexpected weight change.   HENT: Positive for congestion. Negative for ear pain, postnasal drip, rhinorrhea, sinus pressure, sore throat, trouble swallowing and voice change.    Eyes: Negative.  Negative for pain and redness.   Respiratory: Negative.  Negative for cough, choking, chest tightness, shortness of breath and wheezing.    Cardiovascular: Negative.   "Negative for chest pain, palpitations and leg swelling.   Gastrointestinal: Negative.  Negative for abdominal distention, abdominal pain, constipation, diarrhea, nausea and rectal pain.   Endocrine: Negative.    Genitourinary: Negative.  Negative for decreased urine volume, difficulty urinating, dysuria, flank pain, hematuria and urgency.   Musculoskeletal: Negative.  Negative for gait problem and neck pain.   Skin: Negative.  Negative for rash and wound.   Allergic/Immunologic: Negative.    Neurological: Negative.  Negative for dizziness, syncope, weakness, light-headedness, numbness and headaches.   Hematological: Negative.    Psychiatric/Behavioral: Negative.  Negative for agitation, behavioral problems, confusion, self-injury, sleep disturbance and suicidal ideas. The patient is not nervous/anxious.      14 Point ROS completed with pertinent positives discussed. All other systems reviewed and are negative.       Objective   Encounter Vitals  /82  Pulse 62  Temp 99.1 °F (37.3 °C)  Ht 170.2 cm (67\")  Wt 78.6 kg (173 lb 3.2 oz)  SpO2 97%  BMI 27.13 kg/m2    Physical Exam   Constitutional: She is oriented to person, place, and time. Vital signs are normal. She appears well-developed and well-nourished.  Non-toxic appearance.   Low grade temp   HENT:   Head: Normocephalic and atraumatic.   Right Ear: Tympanic membrane, external ear and ear canal normal.   Left Ear: Tympanic membrane and external ear normal.   Nose: Nose normal. Right sinus exhibits no maxillary sinus tenderness and no frontal sinus tenderness. Left sinus exhibits no maxillary sinus tenderness and no frontal sinus tenderness.   Mouth/Throat: Uvula is midline and mucous membranes are normal. Posterior oropharyngeal erythema present. No posterior oropharyngeal edema.   Clear post nasal drip  Cerumen to left ext canal   Eyes: Lids are normal. Pupils are equal, round, and reactive to light.   Neck: Trachea normal and normal range of motion. " Neck supple. No thyroid mass and no thyromegaly present.       Cardiovascular: Normal rate, regular rhythm, S1 normal, S2 normal, normal heart sounds and intact distal pulses.  Exam reveals no gallop and no friction rub.    No murmur heard.  Pulmonary/Chest: Effort normal and breath sounds normal. No respiratory distress. She has no wheezes. She has no rales.   Abdominal: Soft. Normal appearance and bowel sounds are normal. She exhibits no mass. There is no rebound and no guarding.   Musculoskeletal: Normal range of motion.   Lymphadenopathy:     She has cervical adenopathy.        Left cervical: Superficial cervical and deep cervical adenopathy present. No posterior cervical adenopathy present.   Neurological: She is alert and oriented to person, place, and time. She has normal strength. No cranial nerve deficit or sensory deficit. Gait normal.   Skin: Skin is warm and dry. No rash noted.   Psychiatric: She has a normal mood and affect. Her speech is normal and behavior is normal. Judgment and thought content normal. Cognition and memory are normal.   Nursing note and vitals reviewed.      Pertinent Labs  Office Visit on 12/20/2017   Component Date Value Ref Range Status   • Rapid Influenza A Ag 12/20/2017 negative   Final   • Rapid Influenza B Ag 12/20/2017 negative   Final   • Internal Control 12/20/2017 Passed  Passed Final   • Lot Number 12/20/2017 [id number]   Final   • Expiration Date 12/20/2017 9/2019   Final   Lab on 12/06/2017   Component Date Value Ref Range Status   • Glucose 12/06/2017 113* 70 - 100 mg/dL Final   • BUN 12/06/2017 15  8 - 25 mg/dL Final   • Creatinine 12/06/2017 0.80  0.40 - 1.30 mg/dL Final   • Sodium 12/06/2017 144  134 - 146 mmol/L Final   • Potassium 12/06/2017 3.3* 3.4 - 5.4 mmol/L Final   • Chloride 12/06/2017 105  100 - 112 mmol/L Final   • CO2 12/06/2017 29.0  20.0 - 32.0 mmol/L Final   • Calcium 12/06/2017 9.3  8.4 - 10.8 mg/dL Final   • Total Protein 12/06/2017 6.8  6.7 - 8.2  g/dL Final   • Albumin 12/06/2017 4.20  3.20 - 5.50 g/dL Final   • ALT (SGPT) 12/06/2017 42  10 - 60 U/L Final   • AST (SGOT) 12/06/2017 35  10 - 60 U/L Final   • Alkaline Phosphatase 12/06/2017 81  15 - 121 U/L Final   • Total Bilirubin 12/06/2017 0.7  0.2 - 1.0 mg/dL Final   • eGFR Non African Amer 12/06/2017 71  45 - 104 mL/min/1.73 Final   • Globulin 12/06/2017 2.6  2.5 - 4.6 gm/dL Final   • A/G Ratio 12/06/2017 1.6  1.0 - 3.0 g/dL Final   • BUN/Creatinine Ratio 12/06/2017 18.8  7.0 - 25.0 Final   • Anion Gap 12/06/2017 10.0  5.0 - 15.0 mmol/L Final   • Hemoglobin A1C 12/06/2017 5.6  4 - 5.6 % Final   • LDL Cholesterol  12/06/2017 130* 0 - 129 mg/dL Final     Labs have been independently reviewed    Key Imaging/Tracings/POC Testing    Assessment and Medications  Problems Addressed this Visit     None      Visit Diagnoses     Acute pharyngitis, unspecified etiology    -  Primary    Relevant Medications    cefTRIAXone (ROCEPHIN) injection 1 g    Swelling of lymph node        Relevant Medications    cefTRIAXone (ROCEPHIN) injection 1 g        Side effects of ordered medications discussed with patient.     Plan/Additional Notes/Instructions  Plan   1. Will treat for suspected lymph node enlargement   2. Can not completely rule out thyroid given location of edema   3. If swelling is not reactive to abx, will order thyroid u/s and TSH/T4  4. Will schedule call back to check on pt for nxt week  5. Patient to notify staff if swelling has not resolved after completion of above ordered POC    Follow-Up  Return if symptoms worsen or fail to improve.    Patient/caregiver verbalizes understanding of all orders and instructions in this plan of care.           This document has been electronically signed by JERAD Abbott on January 4, 2018 12:11 PM

## 2018-01-08 ENCOUNTER — TELEPHONE (OUTPATIENT)
Dept: FAMILY MEDICINE CLINIC | Facility: CLINIC | Age: 68
End: 2018-01-08

## 2018-01-08 NOTE — TELEPHONE ENCOUNTER
----- Message from JERAD Abbott sent at 1/4/2018 12:09 PM CST -----  Please call to check on patient next Monday. See if swelling has decreased and if she is feeling better. Thank you

## 2018-03-20 ENCOUNTER — OFFICE VISIT (OUTPATIENT)
Dept: FAMILY MEDICINE CLINIC | Facility: CLINIC | Age: 68
End: 2018-03-20

## 2018-03-20 VITALS
WEIGHT: 171 LBS | DIASTOLIC BLOOD PRESSURE: 82 MMHG | SYSTOLIC BLOOD PRESSURE: 126 MMHG | BODY MASS INDEX: 26.84 KG/M2 | HEART RATE: 72 BPM | OXYGEN SATURATION: 96 % | TEMPERATURE: 96.1 F | HEIGHT: 67 IN

## 2018-03-20 DIAGNOSIS — I10 ESSENTIAL HYPERTENSION: ICD-10-CM

## 2018-03-20 DIAGNOSIS — J01.00 ACUTE NON-RECURRENT MAXILLARY SINUSITIS: Primary | ICD-10-CM

## 2018-03-20 PROCEDURE — 96372 THER/PROPH/DIAG INJ SC/IM: CPT | Performed by: FAMILY MEDICINE

## 2018-03-20 PROCEDURE — 99213 OFFICE O/P EST LOW 20 MIN: CPT | Performed by: FAMILY MEDICINE

## 2018-03-20 RX ORDER — DEXTROMETHORPHAN HYDROBROMIDE AND PROMETHAZINE HYDROCHLORIDE 15; 6.25 MG/5ML; MG/5ML
5 SYRUP ORAL 4 TIMES DAILY PRN
Qty: 180 ML | Refills: 1 | Status: SHIPPED | OUTPATIENT
Start: 2018-03-20 | End: 2018-06-13

## 2018-03-20 RX ORDER — AZITHROMYCIN 250 MG/1
TABLET, FILM COATED ORAL
Qty: 6 TABLET | Refills: 0 | Status: SHIPPED | OUTPATIENT
Start: 2018-03-20 | End: 2018-06-13

## 2018-03-20 RX ORDER — METHYLPREDNISOLONE ACETATE 80 MG/ML
80 INJECTION, SUSPENSION INTRA-ARTICULAR; INTRALESIONAL; INTRAMUSCULAR; SOFT TISSUE ONCE
Status: COMPLETED | OUTPATIENT
Start: 2018-03-20 | End: 2018-03-20

## 2018-03-20 RX ADMIN — METHYLPREDNISOLONE ACETATE 80 MG: 80 INJECTION, SUSPENSION INTRA-ARTICULAR; INTRALESIONAL; INTRAMUSCULAR; SOFT TISSUE at 13:29

## 2018-03-20 NOTE — PROGRESS NOTES
Subjective   Kristal Lynn is a 67 y.o. female.   Chief Complaint   Patient presents with   • Cough     x 7 days, not productive    • Fever     102 on 3-18-18   • Nasal Congestion     taking OTC        Cough   This is a new problem. The current episode started in the past 7 days. The problem has been gradually improving. The cough is non-productive. Associated symptoms include a fever, nasal congestion and rhinorrhea. Treatments tried: benadryl. The treatment provided mild relief.   Fever    Associated symptoms include coughing.        The following portions of the patient's history were reviewed and updated as appropriate: allergies, current medications, past family history, past medical history, past social history, past surgical history and problem list.    Review of Systems   Constitutional: Positive for fever.   HENT: Positive for rhinorrhea.    Eyes: Negative.    Respiratory: Positive for cough.    Cardiovascular: Negative.    Gastrointestinal: Negative.    Endocrine: Negative.    Genitourinary: Negative.    Musculoskeletal: Negative.    Skin: Negative.    Allergic/Immunologic: Negative.    Neurological: Negative.    Hematological: Negative.    Psychiatric/Behavioral: Negative.    All other systems reviewed and are negative.      Objective   Physical Exam   Constitutional: She is oriented to person, place, and time. She appears well-developed and well-nourished.   HENT:   Head: Normocephalic and atraumatic.   Right Ear: External ear normal.   Left Ear: External ear normal.   Nose: Nose normal.   Mouth/Throat: Oropharynx is clear and moist.   Eyes: Conjunctivae and EOM are normal. Pupils are equal, round, and reactive to light.   Neck: Normal range of motion. Neck supple.   Cardiovascular: Normal rate, regular rhythm, normal heart sounds and intact distal pulses.  Exam reveals no gallop and no friction rub.    No murmur heard.  Pulmonary/Chest: Effort normal and breath sounds normal. She has no wheezes. She  has no rales.   Abdominal: Soft. Bowel sounds are normal. She exhibits no mass. There is no tenderness. There is no rebound and no guarding.   Musculoskeletal: Normal range of motion.   Neurological: She is alert and oriented to person, place, and time. She has normal reflexes. No cranial nerve deficit. She exhibits normal muscle tone.   Skin: Skin is warm and dry. No rash noted.   Psychiatric: She has a normal mood and affect. Her behavior is normal. Judgment and thought content normal.   Nursing note and vitals reviewed.      Assessment/Plan   Kristal was seen today for cough, fever and nasal congestion.    Diagnoses and all orders for this visit:    Acute non-recurrent maxillary sinusitis  -     methylPREDNISolone acetate (DEPO-medrol) injection 80 mg; Inject 1 mL into the shoulder, thigh, or buttocks 1 (One) Time.    Essential hypertension    Other orders  -     azithromycin (ZITHROMAX Z-GLORIA) 250 MG tablet; Take 2 tablets the first day, then 1 tablet daily for 4 days.  -     promethazine-dextromethorphan (PROMETHAZINE-DM) 6.25-15 MG/5ML syrup; Take 5 mL by mouth 4 (Four) Times a Day As Needed for Cough.

## 2018-06-05 ENCOUNTER — LAB (OUTPATIENT)
Dept: LAB | Facility: OTHER | Age: 68
End: 2018-06-05

## 2018-06-05 DIAGNOSIS — R73.9 HYPERGLYCEMIA: ICD-10-CM

## 2018-06-05 DIAGNOSIS — I10 ESSENTIAL HYPERTENSION: ICD-10-CM

## 2018-06-05 LAB
ALBUMIN SERPL-MCNC: 4.2 G/DL (ref 3.2–5.5)
ALBUMIN/GLOB SERPL: 1.5 G/DL (ref 1–3)
ALP SERPL-CCNC: 67 U/L (ref 15–121)
ALT SERPL W P-5'-P-CCNC: 43 U/L (ref 10–60)
ANION GAP SERPL CALCULATED.3IONS-SCNC: 7 MMOL/L (ref 5–15)
AST SERPL-CCNC: 38 U/L (ref 10–60)
BASOPHILS # BLD AUTO: 0.03 10*3/MM3 (ref 0–0.2)
BASOPHILS NFR BLD AUTO: 0.7 % (ref 0–2)
BILIRUB SERPL-MCNC: 0.9 MG/DL (ref 0.2–1)
BUN BLD-MCNC: 14 MG/DL (ref 8–25)
BUN/CREAT SERPL: 15.6 (ref 7–25)
CALCIUM SPEC-SCNC: 9 MG/DL (ref 8.4–10.8)
CHLORIDE SERPL-SCNC: 105 MMOL/L (ref 100–112)
CHOLEST SERPL-MCNC: 197 MG/DL (ref 150–200)
CO2 SERPL-SCNC: 28 MMOL/L (ref 20–32)
CREAT BLD-MCNC: 0.9 MG/DL (ref 0.4–1.3)
DEPRECATED RDW RBC AUTO: 44.7 FL (ref 36.4–46.3)
EOSINOPHIL # BLD AUTO: 0.1 10*3/MM3 (ref 0–0.7)
EOSINOPHIL NFR BLD AUTO: 2.5 % (ref 0–7)
ERYTHROCYTE [DISTWIDTH] IN BLOOD BY AUTOMATED COUNT: 13.6 % (ref 11.5–14.5)
GFR SERPL CREATININE-BSD FRML MDRD: 62 ML/MIN/1.73 (ref 45–104)
GLOBULIN UR ELPH-MCNC: 2.8 GM/DL (ref 2.5–4.6)
GLUCOSE BLD-MCNC: 106 MG/DL (ref 70–100)
HBA1C MFR BLD: 5.6 % (ref 4–5.6)
HCT VFR BLD AUTO: 41.1 % (ref 35–45)
HDLC SERPL-MCNC: 47 MG/DL (ref 35–100)
HGB BLD-MCNC: 13.7 G/DL (ref 12–15.5)
LDLC SERPL CALC-MCNC: 124 MG/DL
LDLC/HDLC SERPL: 2.65 {RATIO}
LYMPHOCYTES # BLD AUTO: 1.44 10*3/MM3 (ref 0.6–4.2)
LYMPHOCYTES NFR BLD AUTO: 35.9 % (ref 10–50)
MCH RBC QN AUTO: 30.6 PG (ref 26.5–34)
MCHC RBC AUTO-ENTMCNC: 33.3 G/DL (ref 31.4–36)
MCV RBC AUTO: 91.7 FL (ref 80–98)
MONOCYTES # BLD AUTO: 0.32 10*3/MM3 (ref 0–0.9)
MONOCYTES NFR BLD AUTO: 8 % (ref 0–12)
NEUTROPHILS # BLD AUTO: 2.12 10*3/MM3 (ref 2–8.6)
NEUTROPHILS NFR BLD AUTO: 52.9 % (ref 37–80)
PLATELET # BLD AUTO: 215 10*3/MM3 (ref 150–450)
PMV BLD AUTO: 9.4 FL (ref 8–12)
POTASSIUM BLD-SCNC: 3.8 MMOL/L (ref 3.4–5.4)
PROT SERPL-MCNC: 7 G/DL (ref 6.7–8.2)
RBC # BLD AUTO: 4.48 10*6/MM3 (ref 3.77–5.16)
SODIUM BLD-SCNC: 140 MMOL/L (ref 134–146)
TRIGL SERPL-MCNC: 128 MG/DL (ref 35–160)
TSH SERPL DL<=0.05 MIU/L-ACNC: 2.76 MIU/ML (ref 0.46–4.68)
VLDLC SERPL-MCNC: 25.6 MG/DL
WBC NRBC COR # BLD: 4.01 10*3/MM3 (ref 3.2–9.8)

## 2018-06-05 PROCEDURE — 84443 ASSAY THYROID STIM HORMONE: CPT | Performed by: FAMILY MEDICINE

## 2018-06-05 PROCEDURE — 83036 HEMOGLOBIN GLYCOSYLATED A1C: CPT | Performed by: FAMILY MEDICINE

## 2018-06-05 PROCEDURE — 85025 COMPLETE CBC W/AUTO DIFF WBC: CPT | Performed by: FAMILY MEDICINE

## 2018-06-05 PROCEDURE — 36415 COLL VENOUS BLD VENIPUNCTURE: CPT | Performed by: FAMILY MEDICINE

## 2018-06-05 PROCEDURE — 80053 COMPREHEN METABOLIC PANEL: CPT | Performed by: FAMILY MEDICINE

## 2018-06-05 PROCEDURE — 80061 LIPID PANEL: CPT | Performed by: FAMILY MEDICINE

## 2018-06-13 ENCOUNTER — OFFICE VISIT (OUTPATIENT)
Dept: FAMILY MEDICINE CLINIC | Facility: CLINIC | Age: 68
End: 2018-06-13

## 2018-06-13 VITALS
OXYGEN SATURATION: 98 % | WEIGHT: 171 LBS | SYSTOLIC BLOOD PRESSURE: 130 MMHG | TEMPERATURE: 98.9 F | DIASTOLIC BLOOD PRESSURE: 78 MMHG | HEART RATE: 70 BPM | HEIGHT: 67 IN | BODY MASS INDEX: 26.84 KG/M2

## 2018-06-13 DIAGNOSIS — E78.01 FAMILIAL HYPERCHOLESTEROLEMIA: ICD-10-CM

## 2018-06-13 DIAGNOSIS — R73.9 HYPERGLYCEMIA: ICD-10-CM

## 2018-06-13 DIAGNOSIS — E87.6 HYPOKALEMIA: ICD-10-CM

## 2018-06-13 DIAGNOSIS — I10 ESSENTIAL HYPERTENSION: Primary | ICD-10-CM

## 2018-06-13 PROCEDURE — 99214 OFFICE O/P EST MOD 30 MIN: CPT | Performed by: FAMILY MEDICINE

## 2018-06-13 PROCEDURE — G0438 PPPS, INITIAL VISIT: HCPCS | Performed by: FAMILY MEDICINE

## 2018-06-13 RX ORDER — HYDROCHLOROTHIAZIDE 12.5 MG/1
12.5 CAPSULE, GELATIN COATED ORAL DAILY
Qty: 90 CAPSULE | Refills: 2 | Status: SHIPPED | OUTPATIENT
Start: 2018-06-13

## 2018-06-13 RX ORDER — DILTIAZEM HYDROCHLORIDE 180 MG/1
180 CAPSULE, COATED, EXTENDED RELEASE ORAL DAILY
Qty: 90 CAPSULE | Refills: 3 | Status: SHIPPED | OUTPATIENT
Start: 2018-06-13

## 2018-06-13 NOTE — PROGRESS NOTES
QUICK REFERENCE INFORMATION:  The ABCs of the Annual Wellness Visit    Initial Medicare Wellness Visit    HEALTH RISK ASSESSMENT    1950    Recent Hospitalizations:  No hospitalization(s) within the last year..        Current Medical Providers:  Patient Care Team:  Venkatesh Montoya MD as PCP - General (Family Medicine)        Smoking Status:  History   Smoking Status   • Never Smoker   Smokeless Tobacco   • Never Used       Alcohol Consumption:  History   Alcohol Use No       Depression Screen:   PHQ-2/PHQ-9 Depression Screening 1/4/2018   Little interest or pleasure in doing things 0   Feeling down, depressed, or hopeless 0   Trouble falling or staying asleep, or sleeping too much -   Feeling tired or having little energy -   Poor appetite or overeating -   Feeling bad about yourself - or that you are a failure or have let yourself or your family down -   Trouble concentrating on things, such as reading the newspaper or watching television -   Moving or speaking so slowly that other people could have noticed. Or the opposite - being so fidgety or restless that you have been moving around a lot more than usual -   Thoughts that you would be better off dead, or of hurting yourself in some way -   Total Score 0   If you checked off any problems, how difficult have these problems made it for you to do your work, take care of things at home, or get along with other people? -       Health Habits and Functional and Cognitive Screening:  No flowsheet data found.        Does the patient have evidence of cognitive impairment? No    Asiprin use counseling: Does not need ASA (and currently is not on it)      Recent Lab Results:    Visual Acuity:  No exam data present    Age-appropriate Screening Schedule:  Refer to the list below for future screening recommendations based on patient's age, sex and/or medical conditions. Orders for these recommended tests are listed in the plan section. The patient has been provided with  a written plan.    Health Maintenance   Topic Date Due   • ZOSTER VACCINE (2 of 2) 06/14/2017   • DXA SCAN  12/08/2019 (Originally 12/9/2017)   • MAMMOGRAM  06/23/2018   • INFLUENZA VACCINE  08/01/2018   • LIPID PANEL  06/05/2019   • COLONOSCOPY  08/26/2026   • TDAP/TD VACCINES (2 - Td) 06/13/2027   • PNEUMOCOCCAL VACCINES (65+ LOW/MEDIUM RISK)  Addressed        Subjective   History of Present Illness    Kristal Lynn is a 67 y.o. female who presents for an Annual Wellness Visit.    The following portions of the patient's history were reviewed and updated as appropriate: allergies, current medications, past family history, past social history, past surgical history and problem list.    Outpatient Medications Prior to Visit   Medication Sig Dispense Refill   • Calcium-Vitamin D (CALTRATE 600 PLUS-VIT D PO) Take 1 tablet by mouth Daily.     • cetirizine (zyrTEC) 10 MG tablet Take 10 mg by mouth Daily.     • cholecalciferol (VITAMIN D3) 1000 UNITS tablet Take 1,000 Units by mouth Daily.     • clobetasol (TEMOVATE) 0.05 % ointment Apply  topically 2 (Two) Times a Day.     • diltiaZEM CD (CARDIZEM CD) 180 MG 24 hr capsule Take 1 capsule by mouth Daily. 90 capsule 3   • EPIPEN 2-GLORIA 0.3 MG/0.3ML solution auto-injector injection USE AS DIRECTED 1 each 5   • hydrochlorothiazide (MICROZIDE) 12.5 MG capsule Take 1 capsule by mouth Daily. 90 capsule 2   • Multiple Vitamins-Minerals (MULTIVITAL PO) Take 1 tablet by mouth Daily.     • azithromycin (ZITHROMAX Z-GLORIA) 250 MG tablet Take 2 tablets the first day, then 1 tablet daily for 4 days. 6 tablet 0   • promethazine-dextromethorphan (PROMETHAZINE-DM) 6.25-15 MG/5ML syrup Take 5 mL by mouth 4 (Four) Times a Day As Needed for Cough. 180 mL 1     No facility-administered medications prior to visit.        Patient Active Problem List   Diagnosis   • Influenza   • Essential hypertension   • Chest pain   • Hyperlipidemia   • Hyperglycemia   • Hypokalemia       Advance Care  "Planning:  has an advance directive - a copy HAS NOT been provided    Identification of Risk Factors:  Risk factors include: weight .    Review of Systems    Compared to one year ago, the patient feels her physical health is better.  Compared to one year ago, the patient feels her mental health is the same.    Objective     Physical Exam    Vitals:    06/13/18 0943   BP: 130/78   BP Location: Right arm   Patient Position: Sitting   Cuff Size: Adult   Pulse: 70   Temp: 98.9 °F (37.2 °C)   TempSrc: Temporal Artery    SpO2: 98%   Weight: 77.6 kg (171 lb)   Height: 170.2 cm (67\")   PainSc: 0-No pain       Patient's Body mass index is 26.78 kg/m². BMI is above normal parameters. Recommendations include: educational material.      Assessment/Plan   Patient Self-Management and Personalized Health Advice  The patient has been provided with information about: diet and preventive services including:   · Diabetes screening, see lab orders.    Visit Diagnoses:    ICD-10-CM ICD-9-CM   1. Essential hypertension I10 401.9   2. Familial hypercholesterolemia E78.01 272.0   3. Hyperglycemia R73.9 790.29   4. Hypokalemia E87.6 276.8       Orders Placed This Encounter   Procedures   • Comprehensive Metabolic Panel     Standing Status:   Future     Standing Expiration Date:   12/30/2018   • Hemoglobin A1c     Standing Status:   Future     Standing Expiration Date:   12/30/2018   • LDL Cholesterol, Direct     Standing Status:   Future     Standing Expiration Date:   12/30/2018       Outpatient Encounter Prescriptions as of 6/13/2018   Medication Sig Dispense Refill   • Calcium-Vitamin D (CALTRATE 600 PLUS-VIT D PO) Take 1 tablet by mouth Daily.     • cetirizine (zyrTEC) 10 MG tablet Take 10 mg by mouth Daily.     • cholecalciferol (VITAMIN D3) 1000 UNITS tablet Take 1,000 Units by mouth Daily.     • clobetasol (TEMOVATE) 0.05 % ointment Apply  topically 2 (Two) Times a Day.     • diltiaZEM CD (CARDIZEM CD) 180 MG 24 hr capsule Take 1 " capsule by mouth Daily. 90 capsule 3   • EPIPEN 2-GLORIA 0.3 MG/0.3ML solution auto-injector injection USE AS DIRECTED 1 each 5   • hydrochlorothiazide (MICROZIDE) 12.5 MG capsule Take 1 capsule by mouth Daily. 90 capsule 2   • Multiple Vitamins-Minerals (MULTIVITAL PO) Take 1 tablet by mouth Daily.     • [DISCONTINUED] azithromycin (ZITHROMAX Z-GLORIA) 250 MG tablet Take 2 tablets the first day, then 1 tablet daily for 4 days. 6 tablet 0   • [DISCONTINUED] promethazine-dextromethorphan (PROMETHAZINE-DM) 6.25-15 MG/5ML syrup Take 5 mL by mouth 4 (Four) Times a Day As Needed for Cough. 180 mL 1     No facility-administered encounter medications on file as of 6/13/2018.        Reviewed use of high risk medication in the elderly: yes  Reviewed for potential of harmful drug interactions in the elderly: yes    Follow Up:  Return in about 6 months (around 12/13/2018) for Recheck.     An After Visit Summary and PPPS with all of these plans were given to the patient.

## 2018-06-13 NOTE — PROGRESS NOTES
Subjective   Kristal Lynn is a 67 y.o. female who presents to the office for follow-up and review of labs.     History of Present Illness   Patient with history of hypertension, hyperlipidemia, and hyperglycemia is in today for reevaluation and review labs.  Patient is doing well at this time and voices no acute complaints.  She denies chest pain, PND, orthopnea.  She denies neurological symptoms.    The following portions of the patient's history were reviewed and updated as appropriate: allergies, current medications, past family history, past medical history, past social history, past surgical history and problem list.    Review of Systems   Constitutional: Positive for fatigue.   Eyes: Negative.    Cardiovascular: Negative.    Gastrointestinal: Negative.    Endocrine: Negative.    Genitourinary: Negative.    Musculoskeletal: Negative.    Skin: Negative.    Allergic/Immunologic: Negative.    Neurological: Negative.    Hematological: Negative.    Psychiatric/Behavioral: Negative.    All other systems reviewed and are negative.      Objective   Physical Exam   Constitutional: She is oriented to person, place, and time. She appears well-developed and well-nourished.   HENT:   Head: Normocephalic and atraumatic.   Right Ear: External ear normal.   Left Ear: External ear normal.   Nose: Nose normal.   Mouth/Throat: Oropharynx is clear and moist.   Eyes: Conjunctivae and EOM are normal. Pupils are equal, round, and reactive to light.   Neck: Normal range of motion. Neck supple.   Cardiovascular: Normal rate, regular rhythm, normal heart sounds and intact distal pulses.  Exam reveals no gallop and no friction rub.    No murmur heard.  Pulmonary/Chest: Effort normal and breath sounds normal. She has no wheezes. She has no rales.   Abdominal: Soft. Bowel sounds are normal. She exhibits no mass. There is no tenderness. There is no rebound and no guarding.   Musculoskeletal: Normal range of motion.   Neurological: She  is alert and oriented to person, place, and time. She has normal reflexes. No cranial nerve deficit. She exhibits normal muscle tone.   Skin: Skin is warm and dry. No rash noted.   Psychiatric: She has a normal mood and affect. Her behavior is normal. Judgment and thought content normal.   Nursing note and vitals reviewed.      Assessment/Plan   Kristal was seen today for essential hypertension, hyperlipidemia and hypokalemia.    Diagnoses and all orders for this visit:    Essential hypertension  -     Comprehensive Metabolic Panel; Future  -     LDL Cholesterol, Direct; Future    Familial hypercholesterolemia    Hyperglycemia  -     Hemoglobin A1c; Future    Hypokalemia    Other orders  -     hydrochlorothiazide (MICROZIDE) 12.5 MG capsule; Take 1 capsule by mouth Daily.  -     diltiaZEM CD (CARDIZEM CD) 180 MG 24 hr capsule; Take 1 capsule by mouth Daily.         Labs are reviewed with patient.    PHQ-2/PHQ-9 Depression Screening 1/4/2018   Little interest or pleasure in doing things 0   Feeling down, depressed, or hopeless 0   Trouble falling or staying asleep, or sleeping too much -   Feeling tired or having little energy -   Poor appetite or overeating -   Feeling bad about yourself - or that you are a failure or have let yourself or your family down -   Trouble concentrating on things, such as reading the newspaper or watching television -   Moving or speaking so slowly that other people could have noticed. Or the opposite - being so fidgety or restless that you have been moving around a lot more than usual -   Thoughts that you would be better off dead, or of hurting yourself in some way -   Total Score 0   If you checked off any problems, how difficult have these problems made it for you to do your work, take care of things at home, or get along with other people? -         Lab on 06/05/2018   Component Date Value Ref Range Status   • Glucose 06/05/2018 106* 70 - 100 mg/dL Final   • BUN 06/05/2018 14  8 - 25  mg/dL Final   • Creatinine 06/05/2018 0.90  0.40 - 1.30 mg/dL Final   • Sodium 06/05/2018 140  134 - 146 mmol/L Final   • Potassium 06/05/2018 3.8  3.4 - 5.4 mmol/L Final   • Chloride 06/05/2018 105  100 - 112 mmol/L Final   • CO2 06/05/2018 28.0  20.0 - 32.0 mmol/L Final   • Calcium 06/05/2018 9.0  8.4 - 10.8 mg/dL Final   • Total Protein 06/05/2018 7.0  6.7 - 8.2 g/dL Final   • Albumin 06/05/2018 4.20  3.20 - 5.50 g/dL Final   • ALT (SGPT) 06/05/2018 43  10 - 60 U/L Final   • AST (SGOT) 06/05/2018 38  10 - 60 U/L Final   • Alkaline Phosphatase 06/05/2018 67  15 - 121 U/L Final   • Total Bilirubin 06/05/2018 0.9  0.2 - 1.0 mg/dL Final   • eGFR Non African Amer 06/05/2018 62  45 - 104 mL/min/1.73 Final   • Globulin 06/05/2018 2.8  2.5 - 4.6 gm/dL Final   • A/G Ratio 06/05/2018 1.5  1.0 - 3.0 g/dL Final   • BUN/Creatinine Ratio 06/05/2018 15.6  7.0 - 25.0 Final   • Anion Gap 06/05/2018 7.0  5.0 - 15.0 mmol/L Final   • Hemoglobin A1C 06/05/2018 5.6  4 - 5.6 % Final   • Total Cholesterol 06/05/2018 197  150 - 200 mg/dL Final   • Triglycerides 06/05/2018 128  35 - 160 mg/dL Final   • HDL Cholesterol 06/05/2018 47  35 - 100 mg/dL Final   • LDL Cholesterol  06/05/2018 124  mg/dL Final   • VLDL Cholesterol 06/05/2018 25.6  mg/dL Final   • LDL/HDL Ratio 06/05/2018 2.65   Final   • TSH 06/05/2018 2.760  0.460 - 4.680 mIU/mL Final   • WBC 06/05/2018 4.01  3.20 - 9.80 10*3/mm3 Final   • RBC 06/05/2018 4.48  3.77 - 5.16 10*6/mm3 Final   • Hemoglobin 06/05/2018 13.7  12.0 - 15.5 g/dL Final   • Hematocrit 06/05/2018 41.1  35.0 - 45.0 % Final   • MCV 06/05/2018 91.7  80.0 - 98.0 fL Final   • MCH 06/05/2018 30.6  26.5 - 34.0 pg Final   • MCHC 06/05/2018 33.3  31.4 - 36.0 g/dL Final   • RDW 06/05/2018 13.6  11.5 - 14.5 % Final   • RDW-SD 06/05/2018 44.7  36.4 - 46.3 fl Final   • MPV 06/05/2018 9.4  8.0 - 12.0 fL Final   • Platelets 06/05/2018 215  150 - 450 10*3/mm3 Final   • Neutrophil % 06/05/2018 52.9  37.0 - 80.0 % Final   •  Lymphocyte % 06/05/2018 35.9  10.0 - 50.0 % Final   • Monocyte % 06/05/2018 8.0  0.0 - 12.0 % Final   • Eosinophil % 06/05/2018 2.5  0.0 - 7.0 % Final   • Basophil % 06/05/2018 0.7  0.0 - 2.0 % Final   • Neutrophils, Absolute 06/05/2018 2.12  2.00 - 8.60 10*3/mm3 Final   • Lymphocytes, Absolute 06/05/2018 1.44  0.60 - 4.20 10*3/mm3 Final   • Monocytes, Absolute 06/05/2018 0.32  0.00 - 0.90 10*3/mm3 Final   • Eosinophils, Absolute 06/05/2018 0.10  0.00 - 0.70 10*3/mm3 Final   • Basophils, Absolute 06/05/2018 0.03  0.00 - 0.20 10*3/mm3 Final   Office Visit on 12/20/2017   Component Date Value Ref Range Status   • Rapid Influenza A Ag 12/20/2017 negative   Final   • Rapid Influenza B Ag 12/20/2017 negative   Final   • Internal Control 12/20/2017 Passed  Passed Final   • Lot Number 12/20/2017 [id number]   Final   • Expiration Date 12/20/2017 9/2019   Final   ]

## 2018-06-13 NOTE — PATIENT INSTRUCTIONS
Exercising to Lose Weight  Exercising can help you to lose weight. In order to lose weight through exercise, you need to do vigorous-intensity exercise. You can tell that you are exercising with vigorous intensity if you are breathing very hard and fast and cannot hold a conversation while exercising.  Moderate-intensity exercise helps to maintain your current weight. You can tell that you are exercising at a moderate level if you have a higher heart rate and faster breathing, but you are still able to hold a conversation.  How often should I exercise?  Choose an activity that you enjoy and set realistic goals. Your health care provider can help you to make an activity plan that works for you. Exercise regularly as directed by your health care provider. This may include:  · Doing resistance training twice each week, such as:  ¨ Push-ups.  ¨ Sit-ups.  ¨ Lifting weights.  ¨ Using resistance bands.  · Doing a given intensity of exercise for a given amount of time. Choose from these options:  ¨ 150 minutes of moderate-intensity exercise every week.  ¨ 75 minutes of vigorous-intensity exercise every week.  ¨ A mix of moderate-intensity and vigorous-intensity exercise every week.  Children, pregnant women, people who are out of shape, people who are overweight, and older adults may need to consult a health care provider for individual recommendations. If you have any sort of medical condition, be sure to consult your health care provider before starting a new exercise program.  What are some activities that can help me to lose weight?  · Walking at a rate of at least 4.5 miles an hour.  · Jogging or running at a rate of 5 miles per hour.  · Biking at a rate of at least 10 miles per hour.  · Lap swimming.  · Roller-skating or in-line skating.  · Cross-country skiing.  · Vigorous competitive sports, such as football, basketball, and soccer.  · Jumping rope.  · Aerobic dancing.  How can I be more active in my day-to-day  activities?  · Use the stairs instead of the elevator.  · Take a walk during your lunch break.  · If you drive, park your car farther away from work or school.  · If you take public transportation, get off one stop early and walk the rest of the way.  · Make all of your phone calls while standing up and walking around.  · Get up, stretch, and walk around every 30 minutes throughout the day.  What guidelines should I follow while exercising?  · Do not exercise so much that you hurt yourself, feel dizzy, or get very short of breath.  · Consult your health care provider prior to starting a new exercise program.  · Wear comfortable clothes and shoes with good support.  · Drink plenty of water while you exercise to prevent dehydration or heat stroke. Body water is lost during exercise and must be replaced.  · Work out until you breathe faster and your heart beats faster.  This information is not intended to replace advice given to you by your health care provider. Make sure you discuss any questions you have with your health care provider.  Document Released: 01/20/2012 Document Revised: 05/25/2017 Document Reviewed: 05/21/2015  [employer] Interactive Patient Education © 2017 [employer]  Calorie Counting for Weight Loss  Calories are units of energy. Your body needs a certain amount of calories from food to keep you going throughout the day. When you eat more calories than your body needs, your body stores the extra calories as fat. When you eat fewer calories than your body needs, your body burns fat to get the energy it needs.  Calorie counting means keeping track of how many calories you eat and drink each day. Calorie counting can be helpful if you need to lose weight. If you make sure to eat fewer calories than your body needs, you should lose weight. Ask your health care provider what a healthy weight is for you.  For calorie counting to work, you will need to eat the right number of calories in a day in order to  lose a healthy amount of weight per week. A dietitian can help you determine how many calories you need in a day and will give you suggestions on how to reach your calorie goal.  · A healthy amount of weight to lose per week is usually 1-2 lb (0.5-0.9 kg). This usually means that your daily calorie intake should be reduced by 500-750 calories.  · Eating 1,200 - 1,500 calories per day can help most women lose weight.  · Eating 1,500 - 1,800 calories per day can help most men lose weight.  What is my plan?  My goal is to have __________ calories per day.  If I have this many calories per day, I should lose around __________ pounds per week.  What do I need to know about calorie counting?  In order to meet your daily calorie goal, you will need to:  · Find out how many calories are in each food you would like to eat. Try to do this before you eat.  · Decide how much of the food you plan to eat.  · Write down what you ate and how many calories it had. Doing this is called keeping a food log.  To successfully lose weight, it is important to balance calorie counting with a healthy lifestyle that includes regular activity. Aim for 150 minutes of moderate exercise (such as walking) or 75 minutes of vigorous exercise (such as running) each week.  Where do I find calorie information?     The number of calories in a food can be found on a Nutrition Facts label. If a food does not have a Nutrition Facts label, try to look up the calories online or ask your dietitian for help.  Remember that calories are listed per serving. If you choose to have more than one serving of a food, you will have to multiply the calories per serving by the amount of servings you plan to eat. For example, the label on a package of bread might say that a serving size is 1 slice and that there are 90 calories in a serving. If you eat 1 slice, you will have eaten 90 calories. If you eat 2 slices, you will have eaten 180 calories.  How do I keep a food  "log?  Immediately after each meal, record the following information in your food log:  · What you ate. Don't forget to include toppings, sauces, and other extras on the food.  · How much you ate. This can be measured in cups, ounces, or number of items.  · How many calories each food and drink had.  · The total number of calories in the meal.  Keep your food log near you, such as in a small notebook in your pocket, or use a mobile daniel or website. Some programs will calculate calories for you and show you how many calories you have left for the day to meet your goal.  What are some calorie counting tips?  · Use your calories on foods and drinks that will fill you up and not leave you hungry:  ¨ Some examples of foods that fill you up are nuts and nut butters, vegetables, lean proteins, and high-fiber foods like whole grains. High-fiber foods are foods with more than 5 g fiber per serving.  ¨ Drinks such as sodas, specialty coffee drinks, alcohol, and juices have a lot of calories, yet do not fill you up.  · Eat nutritious foods and avoid empty calories. Empty calories are calories you get from foods or beverages that do not have many vitamins or protein, such as candy, sweets, and soda. It is better to have a nutritious high-calorie food (such as an avocado) than a food with few nutrients (such as a bag of chips).  · Know how many calories are in the foods you eat most often. This will help you calculate calorie counts faster.  · Pay attention to calories in drinks. Low-calorie drinks include water and unsweetened drinks.  · Pay attention to nutrition labels for \"low fat\" or \"fat free\" foods. These foods sometimes have the same amount of calories or more calories than the full fat versions. They also often have added sugar, starch, or salt, to make up for flavor that was removed with the fat.  · Find a way of tracking calories that works for you. Get creative. Try different apps or programs if writing down calories " does not work for you.  What are some portion control tips?  · Know how many calories are in a serving. This will help you know how many servings of a certain food you can have.  · Use a measuring cup to measure serving sizes. You could also try weighing out portions on a kitchen scale. With time, you will be able to estimate serving sizes for some foods.  · Take some time to put servings of different foods on your favorite plates, bowls, and cups so you know what a serving looks like.  · Try not to eat straight from a bag or box. Doing this can lead to overeating. Put the amount you would like to eat in a cup or on a plate to make sure you are eating the right portion.  · Use smaller plates, glasses, and bowls to prevent overeating.  · Try not to multitask (for example, watch TV or use your computer) while eating. If it is time to eat, sit down at a table and enjoy your food. This will help you to know when you are full. It will also help you to be aware of what you are eating and how much you are eating.  What are tips for following this plan?  Reading food labels   · Check the calorie count compared to the serving size. The serving size may be smaller than what you are used to eating.  · Check the source of the calories. Make sure the food you are eating is high in vitamins and protein and low in saturated and trans fats.  Shopping   · Read nutrition labels while you shop. This will help you make healthy decisions before you decide to purchase your food.  · Make a grocery list and stick to it.  Cooking   · Try to cook your favorite foods in a healthier way. For example, try baking instead of frying.  · Use low-fat dairy products.  Meal planning   · Use more fruits and vegetables. Half of your plate should be fruits and vegetables.  · Include lean proteins like poultry and fish.  How do I count calories when eating out?  · Ask for smaller portion sizes.  · Consider sharing an entree and sides instead of getting  your own entree.  · If you get your own entree, eat only half. Ask for a box at the beginning of your meal and put the rest of your entree in it so you are not tempted to eat it.  · If calories are listed on the menu, choose the lower calorie options.  · Choose dishes that include vegetables, fruits, whole grains, low-fat dairy products, and lean protein.  · Choose items that are boiled, broiled, grilled, or steamed. Stay away from items that are buttered, battered, fried, or served with cream sauce. Items labeled “crispy” are usually fried, unless stated otherwise.  · Choose water, low-fat milk, unsweetened iced tea, or other drinks without added sugar. If you want an alcoholic beverage, choose a lower calorie option such as a glass of wine or light beer.  · Ask for dressings, sauces, and syrups on the side. These are usually high in calories, so you should limit the amount you eat.  · If you want a salad, choose a garden salad and ask for grilled meats. Avoid extra toppings like fabian, cheese, or fried items. Ask for the dressing on the side, or ask for olive oil and vinegar or lemon to use as dressing.  · Estimate how many servings of a food you are given. For example, a serving of cooked rice is ½ cup or about the size of half a baseball. Knowing serving sizes will help you be aware of how much food you are eating at restaurants. The list below tells you how big or small some common portion sizes are based on everyday objects:  ¨ 1 oz--4 stacked dice.  ¨ 3 oz--1 deck of cards.  ¨ 1 tsp--1 die.  ¨ 1 Tbsp--½ a ping-pong ball.  ¨ 2 Tbsp--1 ping-pong ball.  ¨ ½ cup--½ baseball.  ¨ 1 cup--1 baseball.  Summary  · Calorie counting means keeping track of how many calories you eat and drink each day. If you eat fewer calories than your body needs, you should lose weight.  · A healthy amount of weight to lose per week is usually 1-2 lb (0.5-0.9 kg). This usually means reducing your daily calorie intake by 500-750  calories.  · The number of calories in a food can be found on a Nutrition Facts label. If a food does not have a Nutrition Facts label, try to look up the calories online or ask your dietitian for help.  · Use your calories on foods and drinks that will fill you up, and not on foods and drinks that will leave you hungry.  · Use smaller plates, glasses, and bowls to prevent overeating.  This information is not intended to replace advice given to you by your health care provider. Make sure you discuss any questions you have with your health care provider.  Document Released: 2006 Document Revised: 2017 Document Reviewed: 2017  [employer] Interactive Patient Education © 2017 [employer]    Medicare Wellness  Personal Prevention Plan of Service     Date of Office Visit:  2018  Encounter Provider:  Venkatesh Montoya MD  Place of Service:  [facility]  Patient Name: Kristal Lynn  :  1950    As part of the Medicare Wellness portion of your visit today, we are providing you with this personalized preventive plan of services (PPPS). This plan is based upon recommendations of the [place] Preventive Services Task Force (USPSTF) and the Advisory Committee on Immunization Practices (ACIP).    This lists the preventive care services that should be considered, and provides dates of when you are due. Items listed as completed are up-to-date and do not require any further intervention.    Health Maintenance   Topic Date Due   • MEDICARE ANNUAL WELLNESS  2016   • ZOSTER VACCINE (2 of 2) 2017   • DXA SCAN  2019 (Originally 2017)   • MAMMOGRAM  2018   • INFLUENZA VACCINE  2018   • LIPID PANEL  2019   • COLONOSCOPY  2026   • TDAP/TD VACCINES (2 - Td) 2027   • HEPATITIS C SCREENING  Addressed   • PNEUMOCOCCAL VACCINES (65+ LOW/MEDIUM RISK)  Addressed       Orders Placed This Encounter   Procedures   •  Comprehensive Metabolic Panel     Standing Status:   Future     Standing Expiration Date:   12/30/2018   • Hemoglobin A1c     Standing Status:   Future     Standing Expiration Date:   12/30/2018   • LDL Cholesterol, Direct     Standing Status:   Future     Standing Expiration Date:   12/30/2018       Return in about 6 months (around 12/13/2018) for Recheck.

## 2018-08-14 ENCOUNTER — OFFICE VISIT (OUTPATIENT)
Dept: FAMILY MEDICINE CLINIC | Facility: CLINIC | Age: 68
End: 2018-08-14

## 2018-08-14 VITALS
HEIGHT: 67 IN | DIASTOLIC BLOOD PRESSURE: 70 MMHG | HEART RATE: 68 BPM | SYSTOLIC BLOOD PRESSURE: 132 MMHG | BODY MASS INDEX: 26.37 KG/M2 | OXYGEN SATURATION: 98 % | WEIGHT: 168 LBS

## 2018-08-14 DIAGNOSIS — I10 ESSENTIAL HYPERTENSION: ICD-10-CM

## 2018-08-14 DIAGNOSIS — K21.9 GASTROESOPHAGEAL REFLUX DISEASE, ESOPHAGITIS PRESENCE NOT SPECIFIED: Primary | ICD-10-CM

## 2018-08-14 PROCEDURE — 99214 OFFICE O/P EST MOD 30 MIN: CPT | Performed by: FAMILY MEDICINE

## 2018-08-14 RX ORDER — PANTOPRAZOLE SODIUM 40 MG/1
40 TABLET, DELAYED RELEASE ORAL DAILY
Qty: 30 TABLET | Refills: 6 | Status: SHIPPED | OUTPATIENT
Start: 2018-08-14

## 2018-08-14 RX ORDER — EPINEPHRINE 0.3 MG/.3ML
0.3 INJECTION SUBCUTANEOUS ONCE
Qty: 1 EACH | Refills: 5 | Status: SHIPPED | OUTPATIENT
Start: 2018-08-14 | End: 2018-08-14

## 2018-08-14 NOTE — PATIENT INSTRUCTIONS
Gastroesophageal Reflux Disease, Adult  Normally, food travels down the esophagus and stays in the stomach to be digested. However, when a person has gastroesophageal reflux disease (GERD), food and stomach acid move back up into the esophagus. When this happens, the esophagus becomes sore and inflamed. Over time, GERD can create small holes (ulcers) in the lining of the esophagus.  What are the causes?  This condition is caused by a problem with the muscle between the esophagus and the stomach (lower esophageal sphincter, or LES). Normally, the LES muscle closes after food passes through the esophagus to the stomach. When the LES is weakened or abnormal, it does not close properly, and that allows food and stomach acid to go back up into the esophagus. The LES can be weakened by certain dietary substances, medicines, and medical conditions, including:  · Tobacco use.  · Pregnancy.  · Having a hiatal hernia.  · Heavy alcohol use.  · Certain foods and beverages, such as coffee, chocolate, onions, and peppermint.    What increases the risk?  This condition is more likely to develop in:  · People who have an increased body weight.  · People who have connective tissue disorders.  · People who use NSAID medicines.    What are the signs or symptoms?  Symptoms of this condition include:  · Heartburn.  · Difficult or painful swallowing.  · The feeling of having a lump in the throat.  · A bitter taste in the mouth.  · Bad breath.  · Having a large amount of saliva.  · Having an upset or bloated stomach.  · Belching.  · Chest pain.  · Shortness of breath or wheezing.  · Ongoing (chronic) cough or a night-time cough.  · Wearing away of tooth enamel.  · Weight loss.    Different conditions can cause chest pain. Make sure to see your health care provider if you experience chest pain.  How is this diagnosed?  Your health care provider will take a medical history and perform a physical exam. To determine if you have mild or severe  GERD, your health care provider may also monitor how you respond to treatment. You may also have other tests, including:  · An endoscopy to examine your stomach and esophagus with a small camera.  · A test that measures the acidity level in your esophagus.  · A test that measures how much pressure is on your esophagus.  · A barium swallow or modified barium swallow to show the shape, size, and functioning of your esophagus.    How is this treated?  The goal of treatment is to help relieve your symptoms and to prevent complications. Treatment for this condition may vary depending on how severe your symptoms are. Your health care provider may recommend:  · Changes to your diet.  · Medicine.  · Surgery.    Follow these instructions at home:  Diet  · Follow a diet as recommended by your health care provider. This may involve avoiding foods and drinks such as:  ? Coffee and tea (with or without caffeine).  ? Drinks that contain alcohol.  ? Energy drinks and sports drinks.  ? Carbonated drinks or sodas.  ? Chocolate and cocoa.  ? Peppermint and mint flavorings.  ? Garlic and onions.  ? Horseradish.  ? Spicy and acidic foods, including peppers, chili powder, odom powder, vinegar, hot sauces, and barbecue sauce.  ? Citrus fruit juices and citrus fruits, such as oranges, austin, and limes.  ? Tomato-based foods, such as red sauce, chili, salsa, and pizza with red sauce.  ? Fried and fatty foods, such as donuts, french fries, potato chips, and high-fat dressings.  ? High-fat meats, such as hot dogs and fatty cuts of red and white meats, such as rib eye steak, sausage, ham, and fabian.  ? High-fat dairy items, such as whole milk, butter, and cream cheese.  · Eat small, frequent meals instead of large meals.  · Avoid drinking large amounts of liquid with your meals.  · Avoid eating meals during the 2-3 hours before bedtime.  · Avoid lying down right after you eat.  · Do not exercise right after you eat.  General  instructions  · Pay attention to any changes in your symptoms.  · Take over-the-counter and prescription medicines only as told by your health care provider. Do not take aspirin, ibuprofen, or other NSAIDs unless your health care provider told you to do so.  · Do not use any tobacco products, including cigarettes, chewing tobacco, and e-cigarettes. If you need help quitting, ask your health care provider.  · Wear loose-fitting clothing. Do not wear anything tight around your waist that causes pressure on your abdomen.  · Raise (elevate) the head of your bed 6 inches (15cm).  · Try to reduce your stress, such as with yoga or meditation. If you need help reducing stress, ask your health care provider.  · If you are overweight, reduce your weight to an amount that is healthy for you. Ask your health care provider for guidance about a safe weight loss goal.  · Keep all follow-up visits as told by your health care provider. This is important.  Contact a health care provider if:  · You have new symptoms.  · You have unexplained weight loss.  · You have difficulty swallowing, or it hurts to swallow.  · You have wheezing or a persistent cough.  · Your symptoms do not improve with treatment.  · You have a hoarse voice.  Get help right away if:  · You have pain in your arms, neck, jaw, teeth, or back.  · You feel sweaty, dizzy, or light-headed.  · You have chest pain or shortness of breath.  · You vomit and your vomit looks like blood or coffee grounds.  · You faint.  · Your stool is bloody or black.  · You cannot swallow, drink, or eat.  This information is not intended to replace advice given to you by your health care provider. Make sure you discuss any questions you have with your health care provider.  Document Released: 09/27/2006 Document Revised: 05/17/2017 Document Reviewed: 04/13/2016  [employer] Interactive Patient Education © 2018 [employer]

## 2018-08-14 NOTE — PROGRESS NOTES
Subjective   Kristal Lynn is a 67 y.o. female.     Heartburn   She complains of abdominal pain, chest pain, heartburn and nausea. cough. This is a new problem. The current episode started more than 1 month ago. The problem occurs frequently. The problem has been waxing and waning. The heartburn duration is more than one hour. The heartburn is located in the substernum. The symptoms are aggravated by certain foods. Pertinent negatives include no melena. She has tried an antacid and a histamine-2 antagonist for the symptoms. The treatment provided moderate relief.        The following portions of the patient's history were reviewed and updated as appropriate: allergies, current medications, past family history, past medical history, past social history, past surgical history and problem list.    Review of Systems   Constitutional: Negative.    HENT: Negative.    Eyes: Negative.    Respiratory: Negative.    Cardiovascular: Positive for chest pain.   Gastrointestinal: Positive for abdominal pain, heartburn and nausea. Negative for melena.   Endocrine: Negative.    Genitourinary: Negative.    Musculoskeletal: Negative.    Skin: Negative.    Allergic/Immunologic: Negative.    Neurological: Negative.    Hematological: Negative.    Psychiatric/Behavioral: Negative.    All other systems reviewed and are negative.      Objective   Physical Exam   Constitutional: She is oriented to person, place, and time. She appears well-developed and well-nourished.   HENT:   Head: Normocephalic and atraumatic.   Right Ear: External ear normal.   Left Ear: External ear normal.   Nose: Nose normal.   Mouth/Throat: Oropharynx is clear and moist.   Eyes: Pupils are equal, round, and reactive to light. Conjunctivae and EOM are normal.   Neck: Normal range of motion. Neck supple.   Cardiovascular: Normal rate, regular rhythm, normal heart sounds and intact distal pulses.  Exam reveals no gallop and no friction rub.    No murmur  heard.  Pulmonary/Chest: Effort normal and breath sounds normal. She has no wheezes. She has no rales.   Abdominal: Soft. Bowel sounds are normal. She exhibits no mass. There is no tenderness. There is no rebound and no guarding.   Musculoskeletal: Normal range of motion.   Neurological: She is alert and oriented to person, place, and time. She has normal reflexes. No cranial nerve deficit. She exhibits normal muscle tone.   Skin: Skin is warm and dry. No rash noted.   Psychiatric: She has a normal mood and affect. Her behavior is normal. Judgment and thought content normal.   Nursing note and vitals reviewed.      Assessment/Plan   Kristal was seen today for heartburn.    Diagnoses and all orders for this visit:    Gastroesophageal reflux disease, esophagitis presence not specified    Essential hypertension    Other orders  -     pantoprazole (PROTONIX) 40 MG EC tablet; Take 1 tablet by mouth Daily.  -     EPINEPHrine (EPIPEN 2-GLORIA) 0.3 MG/0.3ML solution auto-injector injection; Inject 0.3 mL into the appropriate muscle as directed by prescriber 1 (One) Time for 1 dose.

## 2021-03-23 ENCOUNTER — BULK ORDERING (OUTPATIENT)
Dept: CASE MANAGEMENT | Facility: OTHER | Age: 71
End: 2021-03-23

## 2021-03-23 DIAGNOSIS — Z23 IMMUNIZATION DUE: ICD-10-CM
